# Patient Record
Sex: FEMALE | Race: WHITE | URBAN - NONMETROPOLITAN AREA
[De-identification: names, ages, dates, MRNs, and addresses within clinical notes are randomized per-mention and may not be internally consistent; named-entity substitution may affect disease eponyms.]

---

## 2023-09-11 ENCOUNTER — OFFICE VISIT (OUTPATIENT)
Dept: PRIMARY CARE | Facility: CLINIC | Age: 33
End: 2023-09-11
Payer: COMMERCIAL

## 2023-09-11 VITALS
DIASTOLIC BLOOD PRESSURE: 80 MMHG | OXYGEN SATURATION: 97 % | RESPIRATION RATE: 16 BRPM | TEMPERATURE: 97.4 F | HEART RATE: 66 BPM | SYSTOLIC BLOOD PRESSURE: 127 MMHG | BODY MASS INDEX: 36.27 KG/M2 | WEIGHT: 244.9 LBS | HEIGHT: 69 IN

## 2023-09-11 DIAGNOSIS — R06.83 SNORING: ICD-10-CM

## 2023-09-11 DIAGNOSIS — G47.8 NON-RESTORATIVE SLEEP: ICD-10-CM

## 2023-09-11 DIAGNOSIS — Z76.89 ENCOUNTER TO ESTABLISH CARE: Primary | ICD-10-CM

## 2023-09-11 DIAGNOSIS — D58.0 SPHEROCYTOSIS (CMS-HCC): ICD-10-CM

## 2023-09-11 DIAGNOSIS — F32.A ANXIETY AND DEPRESSION: ICD-10-CM

## 2023-09-11 DIAGNOSIS — E66.9 CLASS 2 OBESITY WITH BODY MASS INDEX (BMI) OF 36.0 TO 36.9 IN ADULT, UNSPECIFIED OBESITY TYPE, UNSPECIFIED WHETHER SERIOUS COMORBIDITY PRESENT: ICD-10-CM

## 2023-09-11 DIAGNOSIS — Z00.00 HEALTHCARE MAINTENANCE: ICD-10-CM

## 2023-09-11 DIAGNOSIS — F41.9 ANXIETY AND DEPRESSION: ICD-10-CM

## 2023-09-11 DIAGNOSIS — R53.83 FATIGUE, UNSPECIFIED TYPE: ICD-10-CM

## 2023-09-11 PROBLEM — E66.812 CLASS 2 OBESITY WITH BODY MASS INDEX (BMI) OF 36.0 TO 36.9 IN ADULT: Status: ACTIVE | Noted: 2023-09-11

## 2023-09-11 PROCEDURE — 1036F TOBACCO NON-USER: CPT | Performed by: FAMILY MEDICINE

## 2023-09-11 PROCEDURE — 99203 OFFICE O/P NEW LOW 30 MIN: CPT | Performed by: FAMILY MEDICINE

## 2023-09-11 PROCEDURE — 3008F BODY MASS INDEX DOCD: CPT | Performed by: FAMILY MEDICINE

## 2023-09-11 RX ORDER — FLUOXETINE HYDROCHLORIDE 20 MG/1
20 CAPSULE ORAL DAILY
Qty: 30 CAPSULE | Refills: 1 | Status: SHIPPED | OUTPATIENT
Start: 2023-09-11 | End: 2023-10-24 | Stop reason: SDUPTHER

## 2023-09-11 ASSESSMENT — PATIENT HEALTH QUESTIONNAIRE - PHQ9
7. TROUBLE CONCENTRATING ON THINGS, SUCH AS READING THE NEWSPAPER OR WATCHING TELEVISION: NEARLY EVERY DAY
5. POOR APPETITE OR OVEREATING: NEARLY EVERY DAY
4. FEELING TIRED OR HAVING LITTLE ENERGY: NEARLY EVERY DAY
3. TROUBLE FALLING OR STAYING ASLEEP OR SLEEPING TOO MUCH: NEARLY EVERY DAY
9. THOUGHTS THAT YOU WOULD BE BETTER OFF DEAD, OR OF HURTING YOURSELF: NOT AT ALL
8. MOVING OR SPEAKING SO SLOWLY THAT OTHER PEOPLE COULD HAVE NOTICED. OR THE OPPOSITE, BEING SO FIGETY OR RESTLESS THAT YOU HAVE BEEN MOVING AROUND A LOT MORE THAN USUAL: MORE THAN HALF THE DAYS
SUM OF ALL RESPONSES TO PHQ QUESTIONS 1-9: 23
2. FEELING DOWN, DEPRESSED OR HOPELESS: NEARLY EVERY DAY
6. FEELING BAD ABOUT YOURSELF - OR THAT YOU ARE A FAILURE OR HAVE LET YOURSELF OR YOUR FAMILY DOWN: NEARLY EVERY DAY
SUM OF ALL RESPONSES TO PHQ9 QUESTIONS 1 AND 2: 6
1. LITTLE INTEREST OR PLEASURE IN DOING THINGS: NEARLY EVERY DAY

## 2023-09-11 ASSESSMENT — ANXIETY QUESTIONNAIRES
GAD7 TOTAL SCORE: 21
1. FEELING NERVOUS, ANXIOUS, OR ON EDGE: NEARLY EVERY DAY
5. BEING SO RESTLESS THAT IT IS HARD TO SIT STILL: NEARLY EVERY DAY
7. FEELING AFRAID AS IF SOMETHING AWFUL MIGHT HAPPEN: NEARLY EVERY DAY
2. NOT BEING ABLE TO STOP OR CONTROL WORRYING: NEARLY EVERY DAY
6. BECOMING EASILY ANNOYED OR IRRITABLE: NEARLY EVERY DAY
3. WORRYING TOO MUCH ABOUT DIFFERENT THINGS: NEARLY EVERY DAY
4. TROUBLE RELAXING: NEARLY EVERY DAY

## 2023-09-11 NOTE — ASSESSMENT & PLAN NOTE
+ daytime somnolence, + snoring, + non-restorative sleep, there is concern for possible sleep apnea.    Uncontrolled sleep apnea can lead to fatigue, daytime somnolence, weight gain, hypertension, and fluid retention.     At home sleep study ordered. After you drop off equipment it takes 3-4 weeks to get results from studies.     If home sleep study is positive for sleep apnea, I will call to let you know. Call your insurance and see where they cover CPAP equipment. I will fax order for equipment where ever appropriate. We will follow up 30 days after you start this CPAP.     If you have every sign of sleep apnea and at home sleep study is negative, I will make a referral to sleep medicine for formal sleep study.

## 2023-09-11 NOTE — ASSESSMENT & PLAN NOTE
>>ASSESSMENT AND PLAN FOR SPHEROCYTOSIS (CMS/[facility]) WRITTEN ON 9/11/2023  3:34 PM BY ENOCH HAMLIN    Patient reports typically results in leukocytosis, will occasional have anemia or abnormal platelets.  Not presently on any therapy.  CBC ordered today, will revisit at follow-up.

## 2023-09-11 NOTE — PROGRESS NOTES
Subjective   Patient ID: Freida Mcbride is a 33 y.o. female who presents for New Patient Visit (Pt presents for new patient visit to [facility] care- pt states no concerns at this time ) and Flu Vaccine (Pt declines flu vaccine at this time. ).    HPI     ANTOINETTE Guan is a new patient here to establish care.  She is not presently on any prescription medications.    She states she is doing well overall, feels healthy for most part.  Notes she would like to loose weight, states has never been this heavy.  She stays busy with 3 kids and working full time.  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  She is  at high school.    Endorses fatigue, unsure if due to busy lifestyle.  Also notes irritability, states has very little patience.  She reports snoring, non-restorative sleep.  She has never had sleep study evaluation.    She is a triplet  Lost one sister in last year due to blood clot, occurred after she got covid vaccine and was sick.  Her other sister lives with her mom as she has down syndrome.    She was told she had murmur when she was little.  Has not had echo since that time.  Patient denies chest pain, shortness of breath with exertion, palpitations, lower extremity edema, headache.    Notes history of spherocytosis, states typically has leukocytosis sometimes platelets abnormal, sometimes anemia but not always.    She is scheduled to have pap done next month.    Patient declines influenza vaccine.    Grandmother with history of breast cancer diagnosed in 30s.    FMHx of anxiety and depression  Tried Zoloft when pregnant in the past but had weight gain and was ineffective in regards to mood.    PHQ-9 is 23 today  JUANITA-7 is 21 today    Review of Systems   All other systems reviewed and are negative.      Objective   /80 (BP Location: Left arm, Patient Position: Sitting, BP Cuff Size: Large adult)   Pulse 66   Temp 36.3 °C (97.4 °F) (Temporal)   Resp 16   " Ht 1.753 m (5' 9\")   Wt 111 kg (244 lb 14.4 oz)   LMP 09/08/2023 (Exact Date)   SpO2 97%   BMI 36.17 kg/m²     Physical Exam  Vitals and nursing note reviewed.   Constitutional:       General: She is not in acute distress.     Appearance: Normal appearance. She is not toxic-appearing.   HENT:      Head: Normocephalic and atraumatic.      Mouth/Throat:      Comments: Low soft palate; macroglossia  Neck:      Thyroid: No thyromegaly.   Cardiovascular:      Rate and Rhythm: Normal rate and regular rhythm.      Heart sounds: No murmur heard.     No friction rub. No gallop.   Pulmonary:      Effort: Pulmonary effort is normal.      Breath sounds: Normal breath sounds. No wheezing, rhonchi or rales.   Abdominal:      General: Bowel sounds are normal. There is no distension.      Palpations: Abdomen is soft. There is no mass.      Tenderness: There is no abdominal tenderness. There is no guarding.   Musculoskeletal:      Right lower leg: No edema.      Left lower leg: No edema.   Lymphadenopathy:      Cervical: No cervical adenopathy.   Neurological:      General: No focal deficit present.      Mental Status: She is alert and oriented to person, place, and time.   Psychiatric:         Mood and Affect: Mood normal.         Behavior: Behavior normal.         Assessment/Plan   Problem List Items Addressed This Visit       Snoring     + daytime somnolence, + snoring, + non-restorative sleep, there is concern for possible sleep apnea.    Uncontrolled sleep apnea can lead to fatigue, daytime somnolence, weight gain, hypertension, and fluid retention.     At home sleep study ordered. After you drop off equipment it takes 3-4 weeks to get results from studies.     If home sleep study is positive for sleep apnea, I will call to let you know. Call your insurance and see where they cover CPAP equipment. I will fax order for equipment where ever appropriate. We will follow up 30 days after you start this CPAP.     If you have " every sign of sleep apnea and at home sleep study is negative, I will make a referral to sleep medicine for formal sleep study.          Relevant Orders    Home sleep apnea test (HSAT)    Fatigue     Routine blood work ordered today, including TSH to r/o thyroid disorder.         Relevant Orders    TSH with reflex to Free T4 if abnormal    Spherocytosis (CMS/HCC)     Patient reports typically results in leukocytosis, will occasional have anemia or abnormal platelets.  Not presently on any therapy.  CBC ordered today, will revisit at follow-up.         Class 2 obesity with body mass index (BMI) of 36.0 to 36.9 in adult     INTERMITTENT FASTING  You may want to consider intermittent fasting.    There are several different approaches to this, but a straightforward and fairly easy one is to refrain from eating any calories for 12 hours every day. For example, from 8 PM until 8 AM, have nothing but water, or black coffee or tea in the morning. No cream or sugar should be used if you have coffee or tea. Make sure you have adequate fluid intake during this time, and throughout the day. This. A fast demands that your body pull upon stores of visceral fat for energy. This can help improve sugar readings, can improve cholesterol profiles, can decrease waist circumference, and can contribute to weight loss.    If after one month of 12 hour intermittent fasting, you feel comfortable increasing the hours, you may go for 14 hours, and even up to 16 hours. For example, stopped eating at 8 PM and don't eat again until noon the next day, breaking the fast at lunchtime. Snacking during the period of fast will break the fast and will not result in the same outcomes.    It is not recommended that you go beyond 16 hours of fasting.     LIFESTYLE MEASURES  -protein, whatever source you prefer, at intake of 1 g per pound of body weight is recommended daily.  -make sure you are avoiding refined carbs such as breads, pasta, cereal, candy,  soda,  nutrition bars, granola, chips, and sugar sweetened beverages.      -eat 5- 7 servings daily of veggies,  healthy protein such as chicken, fish,  beans, and eggs, and include healthy fats in your diet such as seeds, nuts, olive oil, avocados, and salmon.   -exercise 4 - 6 days per week as you are able, 150 minutes total weekly divided up is recommended.  -strength training is recommended 4:1 to cardio  -Vitamin D is recommended at 1000 - 5000 IU international units daily.   -Always wear sunscreen when you have sun exposure.  -64 oz of water is recommended daily.  -Dental visits recommended every 6 months.  -Eye exam recommended every 2 years, for those with vision problems every year.           Anxiety and depression     Scales and scores have been reviewed and discussed, these are notably elevated.  She has tried Zoloft in past but was ineffective as well as caused weight gain.  Will start patient on trial of Prozac 20 mg daily.  Patient advised it can take 4-6 weeks for medication to reach steady state thus we may not know if this works for patient until this time. Side effects reviewed and discussed, most of these resolve within the first week.  Will recheck mood in 4-6 weeks.         Relevant Medications    FLUoxetine (PROzac) 20 mg capsule     Other Visit Diagnoses       Encounter to establish care    -  Primary    Non-restorative sleep        Relevant Orders    Home sleep apnea test (HSAT)    Healthcare maintenance        Relevant Orders    CBC    Comprehensive Metabolic Panel    Lipid Panel            NPV today for acute issues only, HM labs ordered today, will have patient return for physical portion of CPE.    Follow-up in 4-6 weeks for recheck mood, review labs, and annual physical.   Scales upon rooming.   Labs to be done prior.  Call for sooner follow-up if needed.     Scribe Attestation  By signing my name below, ITanisha, Cristi   attest that this documentation has been prepared under  the direction and in the presence of Callie Cage DO.

## 2023-09-11 NOTE — ASSESSMENT & PLAN NOTE
INTERMITTENT FASTING  You may want to consider intermittent fasting.    There are several different approaches to this, but a straightforward and fairly easy one is to refrain from eating any calories for 12 hours every day. For example, from 8 PM until 8 AM, have nothing but water, or black coffee or tea in the morning. No cream or sugar should be used if you have coffee or tea. Make sure you have adequate fluid intake during this time, and throughout the day. This. A fast demands that your body pull upon stores of visceral fat for energy. This can help improve sugar readings, can improve cholesterol profiles, can decrease waist circumference, and can contribute to weight loss.    If after one month of 12 hour intermittent fasting, you feel comfortable increasing the hours, you may go for 14 hours, and even up to 16 hours. For example, stopped eating at 8 PM and don't eat again until noon the next day, breaking the fast at lunchtime. Snacking during the period of fast will break the fast and will not result in the same outcomes.    It is not recommended that you go beyond 16 hours of fasting.     LIFESTYLE MEASURES  -protein, whatever source you prefer, at intake of 1 g per pound of body weight is recommended daily.  -make sure you are avoiding refined carbs such as breads, pasta, cereal, candy, soda,  nutrition bars, granola, chips, and sugar sweetened beverages.      -eat 5- 7 servings daily of veggies,  healthy protein such as chicken, fish,  beans, and eggs, and include healthy fats in your diet such as seeds, nuts, olive oil, avocados, and salmon.   -exercise 4 - 6 days per week as you are able, 150 minutes total weekly divided up is recommended.  -strength training is recommended 4:1 to cardio  -Vitamin D is recommended at 1000 - 5000 IU international units daily.   -Always wear sunscreen when you have sun exposure.  -64 oz of water is recommended daily.  -Dental visits recommended every 6 months.  -Eye exam  recommended every 2 years, for those with vision problems every year.

## 2023-09-11 NOTE — ASSESSMENT & PLAN NOTE
Patient reports typically results in leukocytosis, will occasional have anemia or abnormal platelets.  Not presently on any therapy.  CBC ordered today, will revisit at follow-up.

## 2023-09-11 NOTE — ASSESSMENT & PLAN NOTE
Scales and scores have been reviewed and discussed, these are notably elevated.  She has tried Zoloft in past but was ineffective as well as caused weight gain.  Will start patient on trial of Prozac 20 mg daily.  Patient advised it can take 4-6 weeks for medication to reach steady state thus we may not know if this works for patient until this time. Side effects reviewed and discussed, most of these resolve within the first week.  Will recheck mood in 4-6 weeks.

## 2023-10-16 ENCOUNTER — LAB (OUTPATIENT)
Dept: LAB | Facility: LAB | Age: 33
End: 2023-10-16
Payer: COMMERCIAL

## 2023-10-16 DIAGNOSIS — Z00.00 HEALTHCARE MAINTENANCE: ICD-10-CM

## 2023-10-16 DIAGNOSIS — R53.83 FATIGUE, UNSPECIFIED TYPE: ICD-10-CM

## 2023-10-16 LAB
ALBUMIN SERPL BCP-MCNC: 4.1 G/DL (ref 3.4–5)
ALP SERPL-CCNC: 65 U/L (ref 33–110)
ALT SERPL W P-5'-P-CCNC: 14 U/L (ref 7–45)
ANION GAP SERPL CALC-SCNC: 15 MMOL/L (ref 10–20)
AST SERPL W P-5'-P-CCNC: 14 U/L (ref 9–39)
BILIRUB SERPL-MCNC: 0.8 MG/DL (ref 0–1.2)
BUN SERPL-MCNC: 7 MG/DL (ref 6–23)
CALCIUM SERPL-MCNC: 9.2 MG/DL (ref 8.6–10.6)
CHLORIDE SERPL-SCNC: 104 MMOL/L (ref 98–107)
CHOLEST SERPL-MCNC: 183 MG/DL (ref 0–199)
CHOLESTEROL/HDL RATIO: 6.4
CO2 SERPL-SCNC: 23 MMOL/L (ref 21–32)
CREAT SERPL-MCNC: 0.55 MG/DL (ref 0.5–1.05)
ERYTHROCYTE [DISTWIDTH] IN BLOOD BY AUTOMATED COUNT: 13.1 % (ref 11.5–14.5)
GFR SERPL CREATININE-BSD FRML MDRD: >90 ML/MIN/1.73M*2
GLUCOSE SERPL-MCNC: 97 MG/DL (ref 74–99)
HCT VFR BLD AUTO: 42 % (ref 36–46)
HDLC SERPL-MCNC: 28.8 MG/DL
HGB BLD-MCNC: 13.8 G/DL (ref 12–16)
LDLC SERPL CALC-MCNC: 117 MG/DL
MCH RBC QN AUTO: 33.3 PG (ref 26–34)
MCHC RBC AUTO-ENTMCNC: 32.9 G/DL (ref 32–36)
MCV RBC AUTO: 101 FL (ref 80–100)
NON HDL CHOLESTEROL: 154 MG/DL (ref 0–149)
NRBC BLD-RTO: 0 /100 WBCS (ref 0–0)
PLATELET # BLD AUTO: 593 X10*3/UL (ref 150–450)
PMV BLD AUTO: 11.7 FL (ref 7.5–11.5)
POTASSIUM SERPL-SCNC: 4.6 MMOL/L (ref 3.5–5.3)
PROT SERPL-MCNC: 7.1 G/DL (ref 6.4–8.2)
RBC # BLD AUTO: 4.14 X10*6/UL (ref 4–5.2)
SODIUM SERPL-SCNC: 137 MMOL/L (ref 136–145)
TRIGL SERPL-MCNC: 185 MG/DL (ref 0–149)
TSH SERPL-ACNC: 1.65 MIU/L (ref 0.44–3.98)
VLDL: 37 MG/DL (ref 0–40)
WBC # BLD AUTO: 13.7 X10*3/UL (ref 4.4–11.3)

## 2023-10-16 PROCEDURE — 36415 COLL VENOUS BLD VENIPUNCTURE: CPT

## 2023-10-16 PROCEDURE — 85027 COMPLETE CBC AUTOMATED: CPT

## 2023-10-16 PROCEDURE — 80061 LIPID PANEL: CPT

## 2023-10-16 PROCEDURE — 80053 COMPREHEN METABOLIC PANEL: CPT

## 2023-10-16 PROCEDURE — 84443 ASSAY THYROID STIM HORMONE: CPT

## 2023-10-23 ENCOUNTER — APPOINTMENT (OUTPATIENT)
Dept: PRIMARY CARE | Facility: CLINIC | Age: 33
End: 2023-10-23
Payer: COMMERCIAL

## 2023-10-24 ENCOUNTER — OFFICE VISIT (OUTPATIENT)
Dept: PRIMARY CARE | Facility: CLINIC | Age: 33
End: 2023-10-24
Payer: COMMERCIAL

## 2023-10-24 VITALS
RESPIRATION RATE: 14 BRPM | TEMPERATURE: 97.8 F | OXYGEN SATURATION: 95 % | HEART RATE: 73 BPM | BODY MASS INDEX: 35.7 KG/M2 | HEIGHT: 69 IN | SYSTOLIC BLOOD PRESSURE: 135 MMHG | WEIGHT: 241 LBS | DIASTOLIC BLOOD PRESSURE: 81 MMHG

## 2023-10-24 DIAGNOSIS — D58.0 HEREDITARY SPHEROCYTOSIS (CMS-HCC): ICD-10-CM

## 2023-10-24 DIAGNOSIS — F32.A ANXIETY AND DEPRESSION: ICD-10-CM

## 2023-10-24 DIAGNOSIS — R06.83 SNORING: ICD-10-CM

## 2023-10-24 DIAGNOSIS — E66.9 CLASS 2 OBESITY WITH BODY MASS INDEX (BMI) OF 35.0 TO 35.9 IN ADULT, UNSPECIFIED OBESITY TYPE, UNSPECIFIED WHETHER SERIOUS COMORBIDITY PRESENT: ICD-10-CM

## 2023-10-24 DIAGNOSIS — F41.9 ANXIETY AND DEPRESSION: ICD-10-CM

## 2023-10-24 DIAGNOSIS — Z00.00 HEALTHCARE MAINTENANCE: Primary | ICD-10-CM

## 2023-10-24 PROBLEM — Z90.81 ACQUIRED ASPLENIA: Status: ACTIVE | Noted: 2020-01-24

## 2023-10-24 PROBLEM — E66.812 CLASS 2 OBESITY WITH BODY MASS INDEX (BMI) OF 36.0 TO 36.9 IN ADULT: Status: RESOLVED | Noted: 2023-09-11 | Resolved: 2023-10-24

## 2023-10-24 PROBLEM — E78.5 HLD (HYPERLIPIDEMIA): Status: ACTIVE | Noted: 2023-10-24

## 2023-10-24 PROBLEM — R91.8 PULMONARY NODULES: Status: ACTIVE | Noted: 2020-01-17

## 2023-10-24 PROCEDURE — 99395 PREV VISIT EST AGE 18-39: CPT | Performed by: FAMILY MEDICINE

## 2023-10-24 PROCEDURE — 1036F TOBACCO NON-USER: CPT | Performed by: FAMILY MEDICINE

## 2023-10-24 PROCEDURE — 3008F BODY MASS INDEX DOCD: CPT | Performed by: FAMILY MEDICINE

## 2023-10-24 RX ORDER — FLUOXETINE HYDROCHLORIDE 20 MG/1
20 CAPSULE ORAL DAILY
Qty: 180 CAPSULE | Refills: 1 | Status: SHIPPED | OUTPATIENT
Start: 2023-10-24 | End: 2024-02-29 | Stop reason: SDUPTHER

## 2023-10-24 ASSESSMENT — ANXIETY QUESTIONNAIRES
1. FEELING NERVOUS, ANXIOUS, OR ON EDGE: SEVERAL DAYS
7. FEELING AFRAID AS IF SOMETHING AWFUL MIGHT HAPPEN: NOT AT ALL
3. WORRYING TOO MUCH ABOUT DIFFERENT THINGS: NOT AT ALL
5. BEING SO RESTLESS THAT IT IS HARD TO SIT STILL: SEVERAL DAYS
GAD7 TOTAL SCORE: 4
2. NOT BEING ABLE TO STOP OR CONTROL WORRYING: SEVERAL DAYS
4. TROUBLE RELAXING: SEVERAL DAYS
6. BECOMING EASILY ANNOYED OR IRRITABLE: NOT AT ALL

## 2023-10-24 ASSESSMENT — PATIENT HEALTH QUESTIONNAIRE - PHQ9
5. POOR APPETITE OR OVEREATING: SEVERAL DAYS
2. FEELING DOWN, DEPRESSED OR HOPELESS: NOT AT ALL
7. TROUBLE CONCENTRATING ON THINGS, SUCH AS READING THE NEWSPAPER OR WATCHING TELEVISION: NOT AT ALL
1. LITTLE INTEREST OR PLEASURE IN DOING THINGS: MORE THAN HALF THE DAYS
6. FEELING BAD ABOUT YOURSELF - OR THAT YOU ARE A FAILURE OR HAVE LET YOURSELF OR YOUR FAMILY DOWN: SEVERAL DAYS
3. TROUBLE FALLING OR STAYING ASLEEP OR SLEEPING TOO MUCH: NOT AT ALL
9. THOUGHTS THAT YOU WOULD BE BETTER OFF DEAD, OR OF HURTING YOURSELF: NOT AT ALL
SUM OF ALL RESPONSES TO PHQ9 QUESTIONS 1 AND 2: 2
8. MOVING OR SPEAKING SO SLOWLY THAT OTHER PEOPLE COULD HAVE NOTICED. OR THE OPPOSITE, BEING SO FIGETY OR RESTLESS THAT YOU HAVE BEEN MOVING AROUND A LOT MORE THAN USUAL: NOT AT ALL
4. FEELING TIRED OR HAVING LITTLE ENERGY: MORE THAN HALF THE DAYS
SUM OF ALL RESPONSES TO PHQ QUESTIONS 1-9: 6

## 2023-10-24 NOTE — ASSESSMENT & PLAN NOTE
Lifestyle managed  Reports grandmother with hx of MI.  No MI or stroke in mother or father.    10/2023 TC/HDL ratio 6.4, HDL 28, , TRIG 185  Goal TC/HDL ratio 3.4 or less, LDL 99 or less,  or less, and TRIG 150 or less.     Diet and exercise recommendations revisited.     LDL, the bad cholesterol, is elevated. To lower this with lifestyle measures:  -make sure you are avoiding refined carbs such as breads, pasta, cereal, candy, soda,  nutrition bars, granola, chips, and sugar sweetened beverages.      -eat 5- 7 servings daily of veggies,  healthy protein such as chicken, fish,  beans, and eggs, and include healthy fats in your diet such as seeds, nuts, olive oil, avocados, and salmon.   -exercise 4 - 6 days per week as you are able, 150 minutes total weekly divided up is recommended.  -consider taking the fiber product psyllium capsules 2 times daily (generic brand is fine) , or a brand name psyllium such as [employer] or Metamucil. WORK UP TO 2 TIMES DAILY.  -consider also adding plant stanols and sterols such as Nature Made CholestOff, available over the counter.

## 2023-10-24 NOTE — PROGRESS NOTES
Subjective   Patient ID: Freida Mcbride is a 33 y.o. female who presents for Annual Exam, Depression, and Anxiety.    HPI     Patient presents today for routine follow-up + CPE.  Patient is doing well overall, they have no new concerns or issues.     WELLNESS VISIT   TDAP: 7/2022  PAP: none found - scheduled 2/2024 per patient  MAMMO: N/A (grandmother w/ h/o of breast cancer)  CSCOPE: N/A  HEP C SCREEN: 7/2022 negative  LIPID: 10/2023 TC/HDL ratio 6.4, HDL 28, , TRIG 185    Alcohol use: rare, 1-2 times per year  Smoking: never smoker    Dental visits: UTD  Vision screening: UTD    Patient declines influenza vaccine.    Cervical cancer screening: Scheduled with GYN in 2/2024  Hx of abnormal paps before pregnancy, have been normal since.  Denies family history in first degree relative.  Denies pelvic pain, vaginal discharge, or vaginal bleeding.    Breast cancer screening: N/A  Denies family history in first degree relative.  Reports family history of second degree relative, grandmother.  Denies lumps/bumps, skin changes, nipple retraction, or nipple drainage.     Colon cancer screening: N/A  Denies family history in first degree relative.  Denies melena, hematochezia, constipation, diarrhea, bloating, change in bowel habits.  Does not have BM every day    ROUTINE VISIT  CHRONIC CONDITIONS:   -Possible sleep apnea  + daytime somnolence, + snoring, + non-restorative sleep  Home sleep study ordered 9/2023  Neck circumference 17 inches 43 cm    -Mood  Hx of anxiety and depression.    Current regimen:  Prozac 20 mg daily, started 9/2023    Prior medications:   Zoloft, ineffective ad caused weight gain.    Patient states she feels better.  She is not on edge as often, thoughts not running as much.  Can sit and relax with kids.  No side effects reported.    Patient is taking and tolerating the medications as prescribed.   Patient denies suicidal ideation or homicidal ideation.   Patient denies any symptoms of lidya  "such as pressured speech, impulsive purchases.  Patient reports good control on the current medication.  Patient is satisfied with their current regimen and wishes to continue.     -Hereditary Spherocytosis  S/p splenectomy age 3  Not presently on any therapy.  Mostly presents with leukocytosis, will occasional have anemia or abnormal platelets.    10/2023 CBC with WBC 13.7 and PLTs 593 k, no anemia.    -HLD  Lifestyle managed  Reports grandmother with hx of MI.  No MI or stroke in mother or father.  10/2023 TC/HDL ratio 6.4, HDL 28, , TRIG 185    Patient denies any facial droop, sudden vision loss, weakness or numbness on one side of body.   Patient denies chest pain, shortness of breath with exertion, palpitations, or symptoms of claudication.       Review of Systems   All other systems reviewed and are negative.      Objective   /81 (BP Location: Left arm, Patient Position: Sitting, BP Cuff Size: Large adult)   Pulse 73   Temp 36.6 °C (97.8 °F) (Temporal)   Resp 14   Ht 1.753 m (5' 9\")   Wt 109 kg (241 lb)   SpO2 95%   BMI 35.59 kg/m²     Physical Exam  Vitals and nursing note reviewed.   Constitutional:       General: She is not in acute distress.     Appearance: Normal appearance. She is not toxic-appearing.   HENT:      Head: Normocephalic and atraumatic.      Mouth/Throat:      Mouth: Mucous membranes are moist.      Pharynx: Oropharynx is clear.      Comments: Low soft palate; macroglossia  Neck:      Thyroid: No thyromegaly.   Cardiovascular:      Rate and Rhythm: Normal rate and regular rhythm.      Heart sounds: No murmur heard.     No friction rub. No gallop.   Pulmonary:      Effort: Pulmonary effort is normal.      Breath sounds: Normal breath sounds. No wheezing, rhonchi or rales.   Abdominal:      General: Bowel sounds are normal. There is no distension.      Palpations: Abdomen is soft. There is no mass.      Tenderness: There is no abdominal tenderness. There is no guarding. "   Musculoskeletal:      Right lower leg: No edema.      Left lower leg: No edema.   Lymphadenopathy:      Cervical: No cervical adenopathy.   Neurological:      General: No focal deficit present.      Mental Status: She is alert and oriented to person, place, and time.   Psychiatric:         Mood and Affect: Mood normal.         Behavior: Behavior normal.         Assessment/Plan   Problem List Items Addressed This Visit             ICD-10-CM    Snoring R06.83     + daytime somnolence, + snoring, + non-restorative sleep, there is concern for possible sleep apnea.    Uncontrolled sleep apnea can lead to fatigue, daytime somnolence, weight gain, hypertension, and fluid retention.     At home sleep study ordered 9/2023, patient did not get call, will reinitiate this process.  Neck circumference 17 inches 43 cm  After you drop off equipment it takes 3-4 weeks to get results from studies.     If home sleep study is positive for sleep apnea, I will call to let you know. Call your insurance and see where they cover CPAP equipment. I will fax order for equipment where ever appropriate. We will follow up 30 days after you start this CPAP.     If you have every sign of sleep apnea and at home sleep study is negative, I will make a referral to sleep medicine for formal sleep study.          Anxiety and depression F41.9, F32.A     Scales and scores have been reviewed and discussed, these are much improved.  Since tolerating well and mood improved, continue Prozac 20 mg daily.  If you ever feel there is room for improvement you can increase the Prozac to 40 mg daily at any time, call for refills at increased dose if needed.         Hereditary spherocytosis (CMS/[facility]) D58.0     10/2023 CBC with WBC 13.7 and PLTs 593 k, no anemia.    S/p splenectomy age 3  Not presently on any therapy.  10/2023 CBC stable in comparison to CBC in 7/2022  Will continue with routine monitoring of CBC.         Healthcare maintenance - Primary Z00.00      Vaccines and screenings reviewed.  Questionnaires completed.  Health and wellness topics reviewed.  Diet and exercise recommendations revisited.  Routine blood work reviewed today.     VACCINES  Flu vaccine deferred  TDAP is UTD until 2032.  Shingrix recommended for ages 50+.    SCREENINGS:  Screening pap smear is managed by GYN, schedules for 2/2024.  Screening mammograms recommended starting age 40.  Screening colonoscopies recommended starting age 45.  Screening DEXA recommended starting age 65.    LIFESTYLE MEASURES  -consider starting Metamucil capsules for both cholesterol and to help with stool formation, see HLD A/P.  -make sure you are avoiding refined carbs such as breads, pasta, cereal, candy, soda,  nutrition bars, granola, chips, and sugar sweetened beverages.      -eat 5- 7 servings daily of veggies,  healthy protein such as chicken, fish,  beans, and eggs, and include healthy fats in your diet such as seeds, nuts, olive oil, avocados, and salmon.   -exercise 4 - 6 days per week as you are able, 150 minutes total weekly divided up is recommended.  -Vitamin D is recommended at 1000 - 5000 IU international units daily.   -Always wear sunscreen when you have sun exposure.  -64 oz of water is recommended daily.  -Dental visits recommended every 6 months.  -Eye exam recommended every 2 years, for those with vision problems every year.            Other Visit Diagnoses         Codes    Class 2 obesity with body mass index (BMI) of 35.0 to 35.9 in adult, unspecified obesity type, unspecified whether serious comorbidity present     E66.9, Z68.35            Follow-up in 6 months for routine care/recheck mood and sleep.  Scales upon rooming.     Can schedule sooner visit for review sleep apnea/CPAP if indicated or required by insurance.    Call for sooner follow-up if needed.       Scribe Attestation  By signing my name below, ITanisha Scribe   attest that this documentation has been prepared under the  direction and in the presence of Callie Cage DO.

## 2023-10-24 NOTE — ASSESSMENT & PLAN NOTE
Scales and scores have been reviewed and discussed, these are much improved.  Since tolerating well and mood improved, continue Prozac 20 mg daily.  If you ever feel there is room for improvement you can increase the Prozac to 40 mg daily at any time, call for refills at increased dose if needed.

## 2023-10-24 NOTE — ASSESSMENT & PLAN NOTE
+ daytime somnolence, + snoring, + non-restorative sleep, there is concern for possible sleep apnea.    Uncontrolled sleep apnea can lead to fatigue, daytime somnolence, weight gain, hypertension, and fluid retention.     At home sleep study ordered 9/2023, patient did not get call, will reinitiate this process.  Neck circumference 17 inches 43 cm  After you drop off equipment it takes 3-4 weeks to get results from studies.     If home sleep study is positive for sleep apnea, I will call to let you know. Call your insurance and see where they cover CPAP equipment. I will fax order for equipment where ever appropriate. We will follow up 30 days after you start this CPAP.     If you have every sign of sleep apnea and at home sleep study is negative, I will make a referral to sleep medicine for formal sleep study.

## 2023-10-24 NOTE — ASSESSMENT & PLAN NOTE
Vaccines and screenings reviewed.  Questionnaires completed.  Health and wellness topics reviewed.  Diet and exercise recommendations revisited.  Routine blood work reviewed today.     VACCINES  Flu vaccine deferred  TDAP is UTD until 2032.  Shingrix recommended for ages 50+.    SCREENINGS:  Screening pap smear is managed by GYN, schedules for 2/2024.  Screening mammograms recommended starting age 40.  Screening colonoscopies recommended starting age 45.  Screening DEXA recommended starting age 65.    LIFESTYLE MEASURES  -consider starting Metamucil capsules for both cholesterol and to help with stool formation, see HLD A/P.  -make sure you are avoiding refined carbs such as breads, pasta, cereal, candy, soda,  nutrition bars, granola, chips, and sugar sweetened beverages.      -eat 5- 7 servings daily of veggies,  healthy protein such as chicken, fish,  beans, and eggs, and include healthy fats in your diet such as seeds, nuts, olive oil, avocados, and salmon.   -exercise 4 - 6 days per week as you are able, 150 minutes total weekly divided up is recommended.  -Vitamin D is recommended at 1000 - 5000 IU international units daily.   -Always wear sunscreen when you have sun exposure.  -64 oz of water is recommended daily.  -Dental visits recommended every 6 months.  -Eye exam recommended every 2 years, for those with vision problems every year.

## 2023-10-24 NOTE — ASSESSMENT & PLAN NOTE
10/2023 CBC with WBC 13.7 and PLTs 593 k, no anemia.    S/p splenectomy age 3  Not presently on any therapy.  10/2023 CBC stable in comparison to CBC in 7/2022  Will continue with routine monitoring of CBC.

## 2024-02-29 ENCOUNTER — OFFICE VISIT (OUTPATIENT)
Dept: PRIMARY CARE | Facility: CLINIC | Age: 34
End: 2024-02-29
Payer: COMMERCIAL

## 2024-02-29 VITALS
WEIGHT: 250.1 LBS | HEART RATE: 70 BPM | SYSTOLIC BLOOD PRESSURE: 126 MMHG | RESPIRATION RATE: 16 BRPM | TEMPERATURE: 97.8 F | BODY MASS INDEX: 36.93 KG/M2 | DIASTOLIC BLOOD PRESSURE: 83 MMHG | OXYGEN SATURATION: 96 %

## 2024-02-29 DIAGNOSIS — F32.A ANXIETY AND DEPRESSION: ICD-10-CM

## 2024-02-29 DIAGNOSIS — R42 DIZZINESS: Primary | ICD-10-CM

## 2024-02-29 DIAGNOSIS — F41.9 ANXIETY AND DEPRESSION: ICD-10-CM

## 2024-02-29 PROCEDURE — 99213 OFFICE O/P EST LOW 20 MIN: CPT | Performed by: FAMILY MEDICINE

## 2024-02-29 PROCEDURE — 3008F BODY MASS INDEX DOCD: CPT | Performed by: FAMILY MEDICINE

## 2024-02-29 PROCEDURE — 1036F TOBACCO NON-USER: CPT | Performed by: FAMILY MEDICINE

## 2024-02-29 RX ORDER — MECLIZINE HYDROCHLORIDE 25 MG/1
25 TABLET ORAL 3 TIMES DAILY PRN
Qty: 30 TABLET | Refills: 11 | Status: SHIPPED | OUTPATIENT
Start: 2024-02-29 | End: 2025-02-28

## 2024-02-29 RX ORDER — FLUOXETINE HYDROCHLORIDE 20 MG/1
40 CAPSULE ORAL DAILY
Qty: 180 CAPSULE | Refills: 3
Start: 2024-02-29 | End: 2024-03-25 | Stop reason: SDUPTHER

## 2024-02-29 NOTE — ASSESSMENT & PLAN NOTE
Reports doing well on Prozac 20 mg that was started in Sept 2023 but notes increase in anxiety with circumstantial stressors (family hospitalized). Since tolerating current mood medication well will increase dose.    Plan:  -Increase Prozac from 20 mg to 40 mg daily  -If after 4 weeks on 40 mg there is still room for improvement can increase to 60 mg daily  -Advised that max daily dose in 60 mg daily.  -Will check scales at next routine follow-up.

## 2024-02-29 NOTE — PATIENT INSTRUCTIONS
Dizziness  I suspect you may have BPPV, which is Benign Paroxysmal Positional Vertigo.     Plan:  -Start OTC Flonase or Nasacort, 2 squirts each nostril daily x 1-2 weeks incase any inflammation in sinuses is contributing.  -Prescription for Meclizine (Antivert) sent to pharmacy. This is not always effective but patient can call for refills if works for her.  -Recommend searching on YouTube for The Epley Maneuver which will help with repositioning displaced Canaliths.  -If symptoms persist or recur, and at home exercises do not improve symptoms please call me and I will refer you to formal Vestibular Therapy     Anxiety and depression  Reports doing well on Prozac 20 mg that was started in Sept 2023 but notes increase in anxiety with circumstantial stressors (family hospitalized). Since tolerating current mood medication well will increase dose.    Plan:  -Increase Prozac from 20 mg to 40 mg daily  -If after 4 weeks on 40 mg there is still room for improvement can increase to 60 mg daily  -Advised that max daily dose in 60 mg daily.  -Will check scales at next routine follow-up.

## 2024-02-29 NOTE — ASSESSMENT & PLAN NOTE
I suspect you may have BPPV, which is Benign Paroxysmal Positional Vertigo.     Plan:  -Start OTC Flonase or Nasacort, 2 squirts each nostril daily x 1-2 weeks incase any inflammation in sinuses is contributing.  -Prescription for Meclizine (Antivert) sent to pharmacy. This is not always effective but patient can call for refills if works for her.  -Recommend searching on YouTube for The Epley Maneuver which will help with repositioning displaced Canaliths.  -If symptoms persist or recur, and at home exercises do not improve symptoms please call me and I will refer you to formal Vestibular Therapy

## 2024-03-25 DIAGNOSIS — F41.9 ANXIETY AND DEPRESSION: ICD-10-CM

## 2024-03-25 DIAGNOSIS — F32.A ANXIETY AND DEPRESSION: ICD-10-CM

## 2024-03-25 RX ORDER — FLUOXETINE HYDROCHLORIDE 20 MG/1
40 CAPSULE ORAL DAILY
Qty: 180 CAPSULE | Refills: 3 | Status: SHIPPED | OUTPATIENT
Start: 2024-03-25 | End: 2024-04-24 | Stop reason: SDUPTHER

## 2024-04-24 ENCOUNTER — OFFICE VISIT (OUTPATIENT)
Dept: PRIMARY CARE | Facility: CLINIC | Age: 34
End: 2024-04-24
Payer: COMMERCIAL

## 2024-04-24 VITALS
OXYGEN SATURATION: 96 % | SYSTOLIC BLOOD PRESSURE: 124 MMHG | TEMPERATURE: 98.1 F | DIASTOLIC BLOOD PRESSURE: 75 MMHG | RESPIRATION RATE: 14 BRPM | BODY MASS INDEX: 36.67 KG/M2 | WEIGHT: 248.3 LBS | HEART RATE: 73 BPM

## 2024-04-24 DIAGNOSIS — R06.83 SNORING: ICD-10-CM

## 2024-04-24 DIAGNOSIS — F32.A ANXIETY AND DEPRESSION: Primary | ICD-10-CM

## 2024-04-24 DIAGNOSIS — Z82.49 FAMILY HISTORY OF VALVULAR HEART DISEASE: ICD-10-CM

## 2024-04-24 DIAGNOSIS — Z00.00 HEALTHCARE MAINTENANCE: ICD-10-CM

## 2024-04-24 DIAGNOSIS — F41.9 ANXIETY AND DEPRESSION: Primary | ICD-10-CM

## 2024-04-24 DIAGNOSIS — R40.0 DAYTIME SOMNOLENCE: ICD-10-CM

## 2024-04-24 DIAGNOSIS — R00.2 PALPITATIONS: ICD-10-CM

## 2024-04-24 DIAGNOSIS — R01.1 MURMUR, CARDIAC: ICD-10-CM

## 2024-04-24 DIAGNOSIS — D58.0 HEREDITARY SPHEROCYTOSIS (CMS-HCC): ICD-10-CM

## 2024-04-24 DIAGNOSIS — G47.8 NON-RESTORATIVE SLEEP: ICD-10-CM

## 2024-04-24 PROCEDURE — 3008F BODY MASS INDEX DOCD: CPT | Performed by: FAMILY MEDICINE

## 2024-04-24 PROCEDURE — 99214 OFFICE O/P EST MOD 30 MIN: CPT | Performed by: FAMILY MEDICINE

## 2024-04-24 PROCEDURE — 1036F TOBACCO NON-USER: CPT | Performed by: FAMILY MEDICINE

## 2024-04-24 RX ORDER — FLUOXETINE HYDROCHLORIDE 20 MG/1
60 CAPSULE ORAL DAILY
Qty: 270 CAPSULE | Refills: 3 | Status: SHIPPED | OUTPATIENT
Start: 2024-04-24 | End: 2025-04-19

## 2024-04-24 ASSESSMENT — PATIENT HEALTH QUESTIONNAIRE - PHQ9
6. FEELING BAD ABOUT YOURSELF - OR THAT YOU ARE A FAILURE OR HAVE LET YOURSELF OR YOUR FAMILY DOWN: NOT AT ALL
2. FEELING DOWN, DEPRESSED OR HOPELESS: SEVERAL DAYS
5. POOR APPETITE OR OVEREATING: NOT AT ALL
1. LITTLE INTEREST OR PLEASURE IN DOING THINGS: NOT AT ALL
10. IF YOU CHECKED OFF ANY PROBLEMS, HOW DIFFICULT HAVE THESE PROBLEMS MADE IT FOR YOU TO DO YOUR WORK, TAKE CARE OF THINGS AT HOME, OR GET ALONG WITH OTHER PEOPLE: NOT DIFFICULT AT ALL
SUM OF ALL RESPONSES TO PHQ QUESTIONS 1-9: 6
4. FEELING TIRED OR HAVING LITTLE ENERGY: MORE THAN HALF THE DAYS
SUM OF ALL RESPONSES TO PHQ9 QUESTIONS 1 AND 2: 1
7. TROUBLE CONCENTRATING ON THINGS, SUCH AS READING THE NEWSPAPER OR WATCHING TELEVISION: NOT AT ALL
8. MOVING OR SPEAKING SO SLOWLY THAT OTHER PEOPLE COULD HAVE NOTICED. OR THE OPPOSITE, BEING SO FIGETY OR RESTLESS THAT YOU HAVE BEEN MOVING AROUND A LOT MORE THAN USUAL: NOT AT ALL
9. THOUGHTS THAT YOU WOULD BE BETTER OFF DEAD, OR OF HURTING YOURSELF: NOT AT ALL
3. TROUBLE FALLING OR STAYING ASLEEP OR SLEEPING TOO MUCH: NEARLY EVERY DAY

## 2024-04-24 ASSESSMENT — ANXIETY QUESTIONNAIRES
5. BEING SO RESTLESS THAT IT IS HARD TO SIT STILL: NEARLY EVERY DAY
4. TROUBLE RELAXING: NEARLY EVERY DAY
IF YOU CHECKED OFF ANY PROBLEMS ON THIS QUESTIONNAIRE, HOW DIFFICULT HAVE THESE PROBLEMS MADE IT FOR YOU TO DO YOUR WORK, TAKE CARE OF THINGS AT HOME, OR GET ALONG WITH OTHER PEOPLE: NOT DIFFICULT AT ALL
2. NOT BEING ABLE TO STOP OR CONTROL WORRYING: MORE THAN HALF THE DAYS
6. BECOMING EASILY ANNOYED OR IRRITABLE: MORE THAN HALF THE DAYS
1. FEELING NERVOUS, ANXIOUS, OR ON EDGE: MORE THAN HALF THE DAYS
7. FEELING AFRAID AS IF SOMETHING AWFUL MIGHT HAPPEN: SEVERAL DAYS
3. WORRYING TOO MUCH ABOUT DIFFERENT THINGS: NEARLY EVERY DAY
GAD7 TOTAL SCORE: 16

## 2024-04-24 NOTE — PROGRESS NOTES
Subjective   Patient ID: Freida Mcbride is a 33 y.o. female who presents for Follow-up (Pt presents for 6 month follow up scales, sleep- pt states that she would like to discuss upping the dose on her fluoxetine.) and Flu Vaccine (Pt declines flu vaccine at this time. /).    HPI     Patient presents today for routine 6 month follow-up.  Patient is doing well overall, they have no new concerns or issues.     She unexpectedly lost her grandpa a few weeks ago. She found him down in his home a few days after finding out that he bone cancer. Her kids 9 and 10 have been sleeping with her as a result of this. Apart from the kids being in her bed she is sleeping well otherwise.    ROUTINE VISIT  CHRONIC CONDITIONS:   Possible sleep apnea  + daytime somnolence, + snoring, + non-restorative sleep, there is concern for possible sleep apnea.     At home sleep study ordered 9/2023, patient did not get call, will reinitiate this process per 10/2023 OV note.    Neck circumference 17 inches 43 cm    Notes she is down some weight from watching sugars and carbs. She has also cut back on caffeine and increased her water intake.    Mood  Hx of anxiety and depression.  10/2023 TSH normal     Current regimen:  Prozac 40 mg daily, started 9/2023, increased 2/2024     Prior medications:   Zoloft, ineffective ad caused weight gain.    PHQ-9:   6  JUANITA-7: 16    +circumstantial stressors as noted above, took extra 20 mg today and wondering if she can go up daily. Tolerating the Prozac well.    Patient denies suicidal ideation or homicidal ideation.   Patient denies any symptoms of lidya such as pressured speech, impulsive purchases.    Hereditary Spherocytosis  S/p splenectomy age 3  Not presently on any therapy.  Mostly presents with leukocytosis, will occasional have anemia or abnormal platelets.      Review of Systems   All other systems reviewed and are negative.      Objective   /75 (BP Location: Right arm, Patient Position: Sitting,  BP Cuff Size: Large adult)   Pulse 73   Temp 36.7 °C (98.1 °F) (Temporal)   Resp 14   Wt 113 kg (248 lb 4.8 oz)   LMP 03/24/2024 (Approximate)   SpO2 96%   BMI 36.67 kg/m²     Physical Exam  Vitals and nursing note reviewed.   Constitutional:       General: She is not in acute distress.     Appearance: Normal appearance. She is not toxic-appearing.   HENT:      Head: Normocephalic and atraumatic.      Mouth/Throat:      Mouth: Mucous membranes are moist.      Pharynx: Oropharynx is clear.      Comments: Low soft palate; narrow maxilla, macroglossia  Neck:      Thyroid: No thyromegaly.      Comments: Neck circumference 17.5  Cardiovascular:      Rate and Rhythm: Normal rate and regular rhythm.      Heart sounds: Murmur heard.      Systolic murmur is present with a grade of 2/6.      No friction rub. No gallop.   Pulmonary:      Effort: Pulmonary effort is normal.      Breath sounds: Normal breath sounds. No wheezing, rhonchi or rales.   Abdominal:      General: Bowel sounds are normal. There is no distension.      Palpations: Abdomen is soft. There is no mass.      Tenderness: There is no abdominal tenderness. There is no guarding.   Musculoskeletal:      Right lower leg: Edema (trace) present.      Left lower leg: Edema (trace) present.   Lymphadenopathy:      Cervical: No cervical adenopathy.   Neurological:      General: No focal deficit present.      Mental Status: She is alert and oriented to person, place, and time.   Psychiatric:         Mood and Affect: Mood normal.         Behavior: Behavior normal.         Assessment/Plan   Problem List Items Addressed This Visit             ICD-10-CM    Snoring R06.83     + daytime somnolence, + snoring, + non-restorative sleep, there is concern for possible sleep apnea.    Uncontrolled sleep apnea can lead to fatigue, daytime somnolence, weight gain, hypertension, and fluid retention.     At home sleep study ordered fall 2023 however patient never received call to  set this up. Will place new order today to have this process reinitiated.    Neck circumference 17 inches 43 cm    After you drop off equipment it takes 3-4 weeks to get results from studies.     If home sleep study is positive for sleep apnea, I will call to let you know. Call your insurance and see where they cover CPAP equipment. I will fax order for equipment where ever appropriate. We will follow up 30 days after you start this CPAP.     If you have every sign of sleep apnea and at home sleep study is negative, I will make a referral to sleep medicine for formal sleep study.          Relevant Orders    Home sleep apnea test (HSAT)    Anxiety and depression - Primary F41.9, F32.A     Scales and scores reviewed and discussed, mildly elevated consistent with patient report of some circumstantial stressors. Since tolerating current mood medication well will increase dose.    Plan:  -Increase Prozac from 40 mg to 60 mg daily  -Will check scales at next routine follow-up.  -Patient to reach out to revisit mood/scales sooner if suboptimally controlled on max dose Prozac.         Hereditary spherocytosis (CMS-[facility]) D58.0     10/2023 CBC with WBC 13.7 and PLTs 593 k, no anemia.    S/p splenectomy age 3  Not presently on any therapy.  10/2023 CBC stable in comparison to CBC in 7/2022  Will continue with routine monitoring of CBC (ordered today to be done prior to CPE in fall 2024).         Healthcare maintenance Z00.00     CPE due in fall 2024, labs ordered today to be done prior to that visit.         Relevant Orders    CBC and Auto Differential    Comprehensive Metabolic Panel    Lipid Panel    Murmur, cardiac R01.1     2-3/6 systolic murmur RT upper and LT lower sternal borders noted on exam  Patient also experiencing palpitations but no chest pain or SOB.  Because symptomatic and + FMHx of valvular heart disease will get echocardiogram.  Await home sleep study as well.          Other Visit Diagnoses         Codes     Daytime somnolence     R40.0    Relevant Orders    Home sleep apnea test (HSAT)    Non-restorative sleep     G47.8    Relevant Orders    Home sleep apnea test (HSAT)    Palpitations     R00.2    Family history of valvular heart disease     Z82.49            Follow-up in 6 months for routine care + CPE.  Scales upon rooming.   Labs to be done prior.   Call for sooner follow-up if needed.         Scribe Attestation  By signing my name below, I, Cristi Mckeon   attest that this documentation has been prepared under the direction and in the presence of Callie Cage DO.

## 2024-04-24 NOTE — ASSESSMENT & PLAN NOTE
10/2023 CBC with WBC 13.7 and PLTs 593 k, no anemia.    S/p splenectomy age 3  Not presently on any therapy.  10/2023 CBC stable in comparison to CBC in 7/2022  Will continue with routine monitoring of CBC (ordered today to be done prior to CPE in fall 2024).

## 2024-04-24 NOTE — ASSESSMENT & PLAN NOTE
Scales and scores reviewed and discussed, mildly elevated consistent with patient report of some circumstantial stressors. Since tolerating current mood medication well will increase dose.    Plan:  -Increase Prozac from 40 mg to 60 mg daily  -Will check scales at next routine follow-up.  -Patient to reach out to revisit mood/scales sooner if suboptimally controlled on max dose Prozac.

## 2024-04-24 NOTE — ASSESSMENT & PLAN NOTE
+ daytime somnolence, + snoring, + non-restorative sleep, there is concern for possible sleep apnea.    Uncontrolled sleep apnea can lead to fatigue, daytime somnolence, weight gain, hypertension, and fluid retention.     At home sleep study ordered fall 2023 however patient never received call to set this up. Will place new order today to have this process reinitiated.    Neck circumference 17 inches 43 cm    After you drop off equipment it takes 3-4 weeks to get results from studies.     If home sleep study is positive for sleep apnea, I will call to let you know. Call your insurance and see where they cover CPAP equipment. I will fax order for equipment where ever appropriate. We will follow up 30 days after you start this CPAP.     If you have every sign of sleep apnea and at home sleep study is negative, I will make a referral to sleep medicine for formal sleep study.

## 2024-04-24 NOTE — PATIENT INSTRUCTIONS
Healthcare maintenance  CPE due in fall 2024, labs ordered today to be done prior to that visit.    Hereditary spherocytosis (CMS-HCC)  10/2023 CBC with WBC 13.7 and PLTs 593 k, no anemia.    S/p splenectomy age 3  Not presently on any therapy.  10/2023 CBC stable in comparison to CBC in 7/2022  Will continue with routine monitoring of CBC (ordered today to be done prior to CPE in fall 2024).    Anxiety and depression  Scales and scores reviewed and discussed, mildly elevated consistent with patient report of some circumstantial stressors. Since tolerating current mood medication well will increase dose.    Plan:  -Increase Prozac from 40 mg to 60 mg daily  -Will check scales at next routine follow-up.  -Patient to reach out to revisit mood/scales sooner if suboptimally controlled on max dose Prozac.    Snoring  + daytime somnolence, + snoring, + non-restorative sleep, there is concern for possible sleep apnea.    Uncontrolled sleep apnea can lead to fatigue, daytime somnolence, weight gain, hypertension, and fluid retention.     At home sleep study ordered fall 2023 however patient never received call to set this up. Will place new order today to have this process reinitiated.    Neck circumference 17 inches 43 cm    After you drop off equipment it takes 3-4 weeks to get results from studies.     If home sleep study is positive for sleep apnea, I will call to let you know. Call your insurance and see where they cover CPAP equipment. I will fax order for equipment where ever appropriate. We will follow up 30 days after you start this CPAP.     If you have every sign of sleep apnea and at home sleep study is negative, I will make a referral to sleep medicine for formal sleep study.     Murmur, cardiac  2-3/6 systolic murmur RT upper and LT lower sternal borders noted on exam  Patient also experiencing palpitations but no chest pain or SOB.  Because symptomatic and + FMHx of valvular heart disease will get  echocardiogram.  Await home sleep study as well.    Follow up in Oct,  labs before (fast 8 h min ,  have labs done 3-7 d before appt)

## 2024-04-24 NOTE — ASSESSMENT & PLAN NOTE
2-3/6 systolic murmur RT upper and LT lower sternal borders noted on exam  Patient also experiencing palpitations but no chest pain or SOB.  Because symptomatic and + FMHx of valvular heart disease will get echocardiogram.  Await home sleep study as well.

## 2024-05-09 ENCOUNTER — HOSPITAL ENCOUNTER (OUTPATIENT)
Dept: CARDIOLOGY | Facility: CLINIC | Age: 34
Discharge: HOME | End: 2024-05-09
Payer: COMMERCIAL

## 2024-05-09 DIAGNOSIS — Z82.49 FAMILY HISTORY OF VALVULAR HEART DISEASE: Primary | ICD-10-CM

## 2024-05-09 DIAGNOSIS — R06.83 SNORING: ICD-10-CM

## 2024-05-09 DIAGNOSIS — R00.2 PALPITATIONS: ICD-10-CM

## 2024-05-09 DIAGNOSIS — R01.1 MURMUR, CARDIAC: ICD-10-CM

## 2024-05-09 LAB
AORTIC VALVE MEAN GRADIENT: 10 MMHG
AORTIC VALVE PEAK VELOCITY: 2.2 M/S
AV PEAK GRADIENT: 19.4 MMHG
AVA (PEAK VEL): 2.1 CM2
AVA (VTI): 2.35 CM2
EJECTION FRACTION APICAL 4 CHAMBER: 67.6
LEFT ATRIUM VOLUME AREA LENGTH INDEX BSA: 24.7 ML/M2
LEFT VENTRICLE INTERNAL DIMENSION DIASTOLE: 4.7 CM (ref 3.5–6)
LEFT VENTRICULAR OUTFLOW TRACT DIAMETER: 2 CM
LV EJECTION FRACTION BIPLANE: 61 %
MITRAL VALVE E/A RATIO: 1.22
MITRAL VALVE E/E' RATIO: 9.84
RIGHT VENTRICLE FREE WALL PEAK S': 12.7 CM/S
TRICUSPID ANNULAR PLANE SYSTOLIC EXCURSION: 2.6 CM

## 2024-05-09 PROCEDURE — 93306 TTE W/DOPPLER COMPLETE: CPT

## 2024-05-09 PROCEDURE — 93306 TTE W/DOPPLER COMPLETE: CPT | Performed by: STUDENT IN AN ORGANIZED HEALTH CARE EDUCATION/TRAINING PROGRAM

## 2024-05-09 PROCEDURE — 2500000004 HC RX 250 GENERAL PHARMACY W/ HCPCS (ALT 636 FOR OP/ED): Performed by: STUDENT IN AN ORGANIZED HEALTH CARE EDUCATION/TRAINING PROGRAM

## 2024-05-09 RX ADMIN — PERFLUTREN 2 ML OF DILUTION: 6.52 INJECTION, SUSPENSION INTRAVENOUS at 15:22

## 2024-06-18 ENCOUNTER — TELEPHONE (OUTPATIENT)
Dept: PRIMARY CARE | Facility: CLINIC | Age: 34
End: 2024-06-18
Payer: COMMERCIAL

## 2024-06-18 NOTE — TELEPHONE ENCOUNTER
Please let the patient know her sleep study showed mild MANJINDER- we can do a VV to discuss in detail so we can order supplies (can schedule with any available provider)

## 2024-06-24 ENCOUNTER — APPOINTMENT (OUTPATIENT)
Dept: PRIMARY CARE | Facility: CLINIC | Age: 34
End: 2024-06-24
Payer: COMMERCIAL

## 2024-07-31 ENCOUNTER — TELEPHONE (OUTPATIENT)
Dept: PRIMARY CARE | Facility: CLINIC | Age: 34
End: 2024-07-31
Payer: COMMERCIAL

## 2024-10-22 ENCOUNTER — LAB (OUTPATIENT)
Dept: LAB | Facility: LAB | Age: 34
End: 2024-10-22
Payer: COMMERCIAL

## 2024-10-22 DIAGNOSIS — Z00.00 HEALTHCARE MAINTENANCE: ICD-10-CM

## 2024-10-22 PROCEDURE — 80061 LIPID PANEL: CPT

## 2024-10-22 PROCEDURE — 85025 COMPLETE CBC W/AUTO DIFF WBC: CPT

## 2024-10-22 PROCEDURE — 36415 COLL VENOUS BLD VENIPUNCTURE: CPT

## 2024-10-22 PROCEDURE — 80053 COMPREHEN METABOLIC PANEL: CPT

## 2024-10-23 LAB
ALBUMIN SERPL BCP-MCNC: 4.3 G/DL (ref 3.4–5)
ALP SERPL-CCNC: 65 U/L (ref 33–110)
ALT SERPL W P-5'-P-CCNC: 21 U/L (ref 7–45)
ANION GAP SERPL CALC-SCNC: 13 MMOL/L (ref 10–20)
AST SERPL W P-5'-P-CCNC: 18 U/L (ref 9–39)
BASOPHILS # BLD AUTO: 0.15 X10*3/UL (ref 0–0.1)
BASOPHILS NFR BLD AUTO: 0.9 %
BILIRUB SERPL-MCNC: 0.7 MG/DL (ref 0–1.2)
BUN SERPL-MCNC: 7 MG/DL (ref 6–23)
CALCIUM SERPL-MCNC: 9.5 MG/DL (ref 8.6–10.6)
CHLORIDE SERPL-SCNC: 101 MMOL/L (ref 98–107)
CHOLEST SERPL-MCNC: 182 MG/DL (ref 0–199)
CHOLESTEROL/HDL RATIO: 6.3
CO2 SERPL-SCNC: 28 MMOL/L (ref 21–32)
CREAT SERPL-MCNC: 0.58 MG/DL (ref 0.5–1.05)
EGFRCR SERPLBLD CKD-EPI 2021: >90 ML/MIN/1.73M*2
EOSINOPHIL # BLD AUTO: 0.99 X10*3/UL (ref 0–0.7)
EOSINOPHIL NFR BLD AUTO: 5.9 %
ERYTHROCYTE [DISTWIDTH] IN BLOOD BY AUTOMATED COUNT: 13.8 % (ref 11.5–14.5)
GLUCOSE SERPL-MCNC: 79 MG/DL (ref 74–99)
HCT VFR BLD AUTO: 39.5 % (ref 36–46)
HDLC SERPL-MCNC: 28.9 MG/DL
HGB BLD-MCNC: 13.2 G/DL (ref 12–16)
IMM GRANULOCYTES # BLD AUTO: 0.12 X10*3/UL (ref 0–0.7)
IMM GRANULOCYTES NFR BLD AUTO: 0.7 % (ref 0–0.9)
LDLC SERPL CALC-MCNC: 102 MG/DL
LYMPHOCYTES # BLD AUTO: 4.88 X10*3/UL (ref 1.2–4.8)
LYMPHOCYTES NFR BLD AUTO: 29.3 %
MCH RBC QN AUTO: 33.2 PG (ref 26–34)
MCHC RBC AUTO-ENTMCNC: 33.4 G/DL (ref 32–36)
MCV RBC AUTO: 100 FL (ref 80–100)
MONOCYTES # BLD AUTO: 1.37 X10*3/UL (ref 0.1–1)
MONOCYTES NFR BLD AUTO: 8.2 %
NEUTROPHILS # BLD AUTO: 9.15 X10*3/UL (ref 1.2–7.7)
NEUTROPHILS NFR BLD AUTO: 55 %
NON HDL CHOLESTEROL: 153 MG/DL (ref 0–149)
NRBC BLD-RTO: 0 /100 WBCS (ref 0–0)
PLATELET # BLD AUTO: 471 X10*3/UL (ref 150–450)
POTASSIUM SERPL-SCNC: 4.7 MMOL/L (ref 3.5–5.3)
PROT SERPL-MCNC: 7.4 G/DL (ref 6.4–8.2)
RBC # BLD AUTO: 3.97 X10*6/UL (ref 4–5.2)
SODIUM SERPL-SCNC: 137 MMOL/L (ref 136–145)
TRIGL SERPL-MCNC: 256 MG/DL (ref 0–149)
VLDL: 51 MG/DL (ref 0–40)
WBC # BLD AUTO: 16.7 X10*3/UL (ref 4.4–11.3)

## 2024-10-24 ENCOUNTER — APPOINTMENT (OUTPATIENT)
Dept: PRIMARY CARE | Facility: CLINIC | Age: 34
End: 2024-10-24
Payer: COMMERCIAL

## 2024-10-24 VITALS
WEIGHT: 244 LBS | SYSTOLIC BLOOD PRESSURE: 118 MMHG | DIASTOLIC BLOOD PRESSURE: 75 MMHG | BODY MASS INDEX: 34.93 KG/M2 | HEIGHT: 70 IN | TEMPERATURE: 98.6 F | HEART RATE: 75 BPM | OXYGEN SATURATION: 95 %

## 2024-10-24 DIAGNOSIS — G47.30 MILD SLEEP APNEA: ICD-10-CM

## 2024-10-24 DIAGNOSIS — H81.10 BENIGN PAROXYSMAL POSITIONAL VERTIGO, UNSPECIFIED LATERALITY: ICD-10-CM

## 2024-10-24 DIAGNOSIS — E66.812 CLASS 2 OBESITY WITH BODY MASS INDEX (BMI) OF 35.0 TO 35.9 IN ADULT, UNSPECIFIED OBESITY TYPE, UNSPECIFIED WHETHER SERIOUS COMORBIDITY PRESENT: ICD-10-CM

## 2024-10-24 DIAGNOSIS — F41.9 ANXIETY AND DEPRESSION: Primary | ICD-10-CM

## 2024-10-24 DIAGNOSIS — R01.1 MURMUR, CARDIAC: ICD-10-CM

## 2024-10-24 DIAGNOSIS — D58.0 HEREDITARY SPHEROCYTOSIS (CMS-HCC): ICD-10-CM

## 2024-10-24 DIAGNOSIS — E78.5 HYPERLIPIDEMIA, UNSPECIFIED HYPERLIPIDEMIA TYPE: ICD-10-CM

## 2024-10-24 DIAGNOSIS — F32.A ANXIETY AND DEPRESSION: Primary | ICD-10-CM

## 2024-10-24 PROBLEM — R53.83 FATIGUE: Status: RESOLVED | Noted: 2023-09-11 | Resolved: 2024-10-24

## 2024-10-24 PROCEDURE — 3008F BODY MASS INDEX DOCD: CPT | Performed by: FAMILY MEDICINE

## 2024-10-24 PROCEDURE — 99214 OFFICE O/P EST MOD 30 MIN: CPT | Performed by: FAMILY MEDICINE

## 2024-10-24 PROCEDURE — 1036F TOBACCO NON-USER: CPT | Performed by: FAMILY MEDICINE

## 2024-10-24 RX ORDER — FLUOXETINE HYDROCHLORIDE 20 MG/1
20 CAPSULE ORAL DAILY
Qty: 90 CAPSULE | Refills: 1 | Status: SHIPPED | OUTPATIENT
Start: 2024-10-24 | End: 2025-10-19

## 2024-10-24 RX ORDER — BUPROPION HYDROCHLORIDE 150 MG/1
150 TABLET ORAL EVERY MORNING
Qty: 30 TABLET | Refills: 1 | Status: SHIPPED | OUTPATIENT
Start: 2024-10-24 | End: 2024-12-23

## 2024-10-24 RX ORDER — MECLIZINE HYDROCHLORIDE 25 MG/1
TABLET ORAL
Start: 2024-10-24

## 2024-10-24 ASSESSMENT — ANXIETY QUESTIONNAIRES
IF YOU CHECKED OFF ANY PROBLEMS ON THIS QUESTIONNAIRE, HOW DIFFICULT HAVE THESE PROBLEMS MADE IT FOR YOU TO DO YOUR WORK, TAKE CARE OF THINGS AT HOME, OR GET ALONG WITH OTHER PEOPLE: NOT DIFFICULT AT ALL
1. FEELING NERVOUS, ANXIOUS, OR ON EDGE: NOT AT ALL
4. TROUBLE RELAXING: SEVERAL DAYS
3. WORRYING TOO MUCH ABOUT DIFFERENT THINGS: NOT AT ALL
5. BEING SO RESTLESS THAT IT IS HARD TO SIT STILL: NOT AT ALL
6. BECOMING EASILY ANNOYED OR IRRITABLE: NOT AT ALL
2. NOT BEING ABLE TO STOP OR CONTROL WORRYING: SEVERAL DAYS
7. FEELING AFRAID AS IF SOMETHING AWFUL MIGHT HAPPEN: NOT AT ALL
GAD7 TOTAL SCORE: 2

## 2024-10-24 ASSESSMENT — PATIENT HEALTH QUESTIONNAIRE - PHQ9
5. POOR APPETITE OR OVEREATING: NOT AT ALL
4. FEELING TIRED OR HAVING LITTLE ENERGY: SEVERAL DAYS
SUM OF ALL RESPONSES TO PHQ9 QUESTIONS 1 AND 2: 0
2. FEELING DOWN, DEPRESSED OR HOPELESS: NOT AT ALL
8. MOVING OR SPEAKING SO SLOWLY THAT OTHER PEOPLE COULD HAVE NOTICED. OR THE OPPOSITE, BEING SO FIGETY OR RESTLESS THAT YOU HAVE BEEN MOVING AROUND A LOT MORE THAN USUAL: NOT AT ALL
10. IF YOU CHECKED OFF ANY PROBLEMS, HOW DIFFICULT HAVE THESE PROBLEMS MADE IT FOR YOU TO DO YOUR WORK, TAKE CARE OF THINGS AT HOME, OR GET ALONG WITH OTHER PEOPLE: NOT DIFFICULT AT ALL
7. TROUBLE CONCENTRATING ON THINGS, SUCH AS READING THE NEWSPAPER OR WATCHING TELEVISION: NOT AT ALL
3. TROUBLE FALLING OR STAYING ASLEEP OR SLEEPING TOO MUCH: NOT AT ALL
SUM OF ALL RESPONSES TO PHQ QUESTIONS 1-9: 1
1. LITTLE INTEREST OR PLEASURE IN DOING THINGS: NOT AT ALL
6. FEELING BAD ABOUT YOURSELF - OR THAT YOU ARE A FAILURE OR HAVE LET YOURSELF OR YOUR FAMILY DOWN: NOT AT ALL
9. THOUGHTS THAT YOU WOULD BE BETTER OFF DEAD, OR OF HURTING YOURSELF: NOT AT ALL

## 2024-10-24 NOTE — ASSESSMENT & PLAN NOTE
Stable, continue the Meclizine up to nightly for BPPV and insomnia.  To call for refills when due.

## 2024-10-24 NOTE — PROGRESS NOTES
Subjective   Patient ID: Freida Mcbride is a 34 y.o. female who presents for Follow-up (Pt presents for 6 month follow up- pt states no concerns/questions at this time.) and Flu Vaccine (Pt declines flu vaccine at this time. /).    HPI     Patient presents today for 3 month follow-up.    Patient concerns:  No new concerns or issues. Patient is doing well overall.     ROUTINE VISIT  CHRONIC CONDITIONS:   MANJINDER  5/2024 HSAT showed mild sleep apnea    Patient states that she is sleeping better, no more snoring.  Does not think she would be able to tolerate CPAP, does not think she could sleep with something on her face.  Working on weight loss with dietary changes and exercises.    Mood  Hx of anxiety and depression.  10/2023 TSH normal     Current regimen:  Prozac 20 mg daily, started 9/2023     Prior medications:   Zoloft, ineffective ad caused weight gain.    PHQ-9:   1 - Q9 was 0  JUANITA-7:   2    Although previously increased to 60 mg patient reports today she is taking 20 mg of the Prozac only.  Felt increase was helpful when family member passed but then became to tired on high dose.  However, she and her  both notice when she does not take at all.  Feels the 20 mg dose is good fo her.  Mood is doing well overall, no current issues with depression or anxiety  Really working on weight loss with diet and exercise, down 6 lbs since last visit  Got rid of pop, does not like eating out, drinking more water.  Would be open to medication assisted weight loss.    Hereditary Spherocytosis  S/p splenectomy age 3  Not presently on any therapy, CBC monitored routinely.  Mostly presents with leukocytosis, will occasional have anemia or abnormal platelets.    10/2024 CBC with WBC 16.7 and PLTs 471 k, no anemia.  10/2023 CBC with WBC 13.7 and PLTs 593 k, no anemia.     Heart murmur  2-3/6 systolic murmur RT upper and LT lower sternal borders noted on exam 04/2024  Patient also experiencing palpitations but no chest pain or  "SOB.  Because symptomatic and + FMHx of valvular heart disease will get echocardiogram and home sleep study.    5/2024 TTE: LVEF 60-65%. No concerning abnormalities noted.  5/2024 HSAT revealed mild sleep apnea    HLD  Lifestyle managed  Reports father with hx of MI/stroke at age 45  Reports grandmother with hx of MI.    10/2024 TC/HDL ratio 6.3, HDL 28, , TRIG 256  10/2023 TC/HDL ratio 6.4, HDL 28, , TRIG 185    Patient denies any facial droop, sudden vision loss, weakness or numbness on one side of body.   Patient denies chest pain, shortness of breath with exertion, palpitations, or symptoms of claudication.     Review of Systems   All other systems reviewed and are negative.      Objective   /75 (BP Location: Left arm, Patient Position: Sitting, BP Cuff Size: Large adult)   Pulse 75   Temp 37 °C (98.6 °F) (Temporal)   Ht 1.765 m (5' 9.5\")   Wt 111 kg (244 lb)   LMP 09/10/2024 (Approximate)   SpO2 95%   BMI 35.52 kg/m²     Physical Exam  Vitals and nursing note reviewed.   Constitutional:       General: She is not in acute distress.     Appearance: Normal appearance. She is not toxic-appearing.   HENT:      Head: Normocephalic and atraumatic.      Mouth/Throat:      Mouth: Mucous membranes are moist.      Pharynx: Oropharynx is clear.      Comments: Low soft palate; narrow maxilla, macroglossia  Neck:      Thyroid: No thyromegaly.   Cardiovascular:      Rate and Rhythm: Normal rate and regular rhythm.      Heart sounds: Murmur heard.      Systolic murmur is present with a grade of 2/6.      No friction rub. No gallop.   Pulmonary:      Effort: Pulmonary effort is normal.      Breath sounds: Normal breath sounds. No wheezing, rhonchi or rales.   Abdominal:      General: Bowel sounds are normal. There is no distension.      Palpations: Abdomen is soft. There is no mass.      Tenderness: There is no abdominal tenderness. There is no guarding.   Musculoskeletal:      Right lower leg: Edema " (trace) present.      Left lower leg: Edema (trace) present.   Lymphadenopathy:      Cervical: No cervical adenopathy.   Neurological:      General: No focal deficit present.      Mental Status: She is alert and oriented to person, place, and time.   Psychiatric:         Mood and Affect: Mood normal.         Behavior: Behavior normal.         Assessment/Plan   Problem List Items Addressed This Visit             ICD-10-CM    Mild sleep apnea G47.30     05/2024 HSAT showed mild sleep apnea     I discussed sleep study with patient. Since only mild and she is finding improvement in sleep with maximizing lifestyle efforts (diet, exercise, weight reduction) patient defers CPAP therapy at this time. Additionally, she does not feel she would tolerate this CPAP device on her face during sleep.         Class 2 obesity with body mass index (BMI) of 35.0 to 35.9 in adult E66.812, Z68.35     Patient down 6 lbs from prior visit, commended on her great efforts and progress.  Patient has cut out pop and eats out very rarely.  Heart healthy diet and exercise recommendations revisited.   Start trial of Wellbutrin for appetite         Anxiety and depression - Primary F41.9, F32.A     Scales and scores reviewed and discussed, these indicate clinical remission on current regimen.    Plan:  -Continue Prozac 20 mg daily  -Start Wellbutrin 150 mg daily, although this helps with depression patient starting this for appetite suppression. Advised that can increase to 300 mg daily if tolerated but still room for improvement.  -Will recheck scales at next routine follow-up.  -Patient to reach out to revisit mood/scales sooner if suboptimally controlled on above regimen.         Relevant Medications    buPROPion XL (Wellbutrin XL) 150 mg 24 hr tablet    FLUoxetine (PROzac) 20 mg capsule    Hereditary spherocytosis (CMS-HCC) D58.0     S/p splenectomy age 3, not presently on any therapy.    10/2024 CBC with WBC 16.7 and PLTs 471 k, no  anemia.  10/2024 CBC stable in comparison to previously    No medical therapy indicated at this time.  Will continue with routine monitoring of CBC, at minimum to be done annually.         HLD (hyperlipidemia) E78.5     Lifestyle managed  Reports FMHx of father with MI/stroke age 45 and grandmother with hx of MI  Would consider doing CT cardiac scoring in her 40s    10/2024 TC/HDL ratio 6.3, HDL 28, , TRIG 256  10/2023 TC/HDL ratio 6.4, HDL 28, , TRIG 185  Goal TC/HDL ratio 3.4 or less, LDL 99 or less,  or less, and TRIG 150 or less.     -Consider starting fish oil supplementation  -Consider starting Bergemot supplementation  -Diet and exercise recommendations revisited.   -Recheck lipid panel prior to 6 month follow-up.    To lower this with lifestyle measures:  -make sure you are avoiding refined carbs such as breads, pasta, cereal, candy, soda,  nutrition bars, granola, chips, and sugar sweetened beverages.      -eat 5- 7 servings daily of veggies,  healthy protein such as chicken, fish,  beans, and eggs, and include healthy fats in your diet such as seeds, nuts, olive oil, avocados, and salmon.   -exercise 4 - 6 days per week as you are able, 150 minutes total weekly divided up is recommended.  -consider taking the fiber product psyllium capsules 2 times daily (generic brand is fine) , or a brand name psyllium such as [employer] or Metamucil. WORK UP TO 2 TIMES DAILY.  -consider also adding plant stanols and sterols such as Nature Made CholestOff, available over the counter.          BPPV (benign paroxysmal positional vertigo) H81.10     Stable, continue the Meclizine up to nightly for BPPV and insomnia.  To call for refills when due.         Relevant Medications    meclizine (Antivert) 25 mg tablet    Murmur, cardiac R01.1     5/2024 TTE: LVEF 60-65%. No concerning abnormalities noted.   Murmur noted on exam today, echo reviewed as noted above with patient, no concerning  abnormalities.  No further evaluation indicated at this time.              Follow-up in 6 months for routine care.  Scales upon rooming.   Labs to be done prior.   Call for sooner follow-up if needed.     Scribe Attestation  By signing my name below, ITanisha Scribe   attest that this documentation has been prepared under the direction and in the presence of Callie Cage DO.

## 2024-10-24 NOTE — PATIENT INSTRUCTIONS
Hyperlipidemia--    For a lifestyle modification and supplement approach to improving cholesterol, consider taking the fiber product psyllium capsules or powder 2 times daily (generic brand is fine) , or a brand name psyllium such as [employer] [facility] or Metamucil.   Consider also adding plant stanols and sterols such as Nature Made CholestOff, available over the counter.   Both have some data for lowering cholesterol.        Make sure you are limiting refined carbohydrates such as breads, alcohol, cereals, pasta, pretzels, pastries, desserts, and any sugar sweetened beverages.    Eat at least 5 -7 servings of vegetables or fruit daily, aiming for more veggies than fruit.  Eat lean proteins  such as eggs, fish, chicken, beans.   Healthy whole grains can be included such as long -cooking brown rice, quinoa, or millet.   Eat some healthy fats each day - a handful of almonds or walnuts, an avocado, some olive oil on your salad.      150 minutes of exercise per week divided up on multiple days is recommended if you are able.    !!!!!!!!!!!Bergamot is a supplement made from an Italian citrus fruit that may help lower inflammation, blood pressure, and cholesterol.  The extract is used as a fragrance in essential oils, but is also used as an oral supplement to help with blood pressure, cholesterol, and diabetes.    Its high flavanoid content may help to quiet inflammation and improve vascular health.   Take up to 1000 mg daily for 3 months, then cycle off for 3 months.    You can use every other quarter for years.  Bergamot may increase sun sensitivity, particularly when used topically.       !!!!!!!!!!!Add fish oil or krill oil 1000 mg daily     For mood and wt loss- adding wellbutrin 150 mg daily,  can call for refill at 150 mg or 300 mg daily  (patient will call)     Antivert (meclizine)  can use nightly for positional vertigo before you go to bed      Recheck chol numbers in 6 months - pls do blood work about a week  before the visit (don't eat after 8 pm the night before your blood draw)      Would do cardiac calcium score at age 38 in light of low hdl,   high ratio tc:hdl, and because her father had an MI age 45

## 2024-10-24 NOTE — ASSESSMENT & PLAN NOTE
S/p splenectomy age 3, not presently on any therapy.    10/2024 CBC with WBC 16.7 and PLTs 471 k, no anemia.  10/2024 CBC stable in comparison to previously    No medical therapy indicated at this time.  Will continue with routine monitoring of CBC, at minimum to be done annually.

## 2024-10-24 NOTE — ASSESSMENT & PLAN NOTE
Lifestyle managed  Reports FMHx of father with MI/stroke age 45 and grandmother with hx of MI  Would consider doing CT cardiac scoring in her 40s    10/2024 TC/HDL ratio 6.3, HDL 28, , TRIG 256  10/2023 TC/HDL ratio 6.4, HDL 28, , TRIG 185  Goal TC/HDL ratio 3.4 or less, LDL 99 or less,  or less, and TRIG 150 or less.     -Consider starting fish oil supplementation  -Consider starting Bergemot supplementation  -Diet and exercise recommendations revisited.   -Recheck lipid panel prior to 6 month follow-up.    To lower this with lifestyle measures:  -make sure you are avoiding refined carbs such as breads, pasta, cereal, candy, soda,  nutrition bars, granola, chips, and sugar sweetened beverages.      -eat 5- 7 servings daily of veggies,  healthy protein such as chicken, fish,  beans, and eggs, and include healthy fats in your diet such as seeds, nuts, olive oil, avocados, and salmon.   -exercise 4 - 6 days per week as you are able, 150 minutes total weekly divided up is recommended.  -consider taking the fiber product psyllium capsules 2 times daily (generic brand is fine) , or a brand name psyllium such as [employer] or Metamucil. WORK UP TO 2 TIMES DAILY.  -consider also adding plant stanols and sterols such as Nature Made CholestOff, available over the counter.

## 2024-10-24 NOTE — ASSESSMENT & PLAN NOTE
Scales and scores reviewed and discussed, these indicate clinical remission on current regimen.    Plan:  -Continue Prozac 20 mg daily  -Start Wellbutrin 150 mg daily, although this helps with depression patient starting this for appetite suppression. Advised that can increase to 300 mg daily if tolerated but still room for improvement.  -Will recheck scales at next routine follow-up.  -Patient to reach out to revisit mood/scales sooner if suboptimally controlled on above regimen.

## 2024-10-24 NOTE — ASSESSMENT & PLAN NOTE
05/2024 HSAT showed mild sleep apnea     I discussed sleep study with patient. Since only mild and she is finding improvement in sleep with maximizing lifestyle efforts (diet, exercise, weight reduction) patient defers CPAP therapy at this time. Additionally, she does not feel she would tolerate this CPAP device on her face during sleep.

## 2024-10-24 NOTE — ASSESSMENT & PLAN NOTE
Patient down 6 lbs from prior visit, commended on her great efforts and progress.  Patient has cut out pop and eats out very rarely.  Heart healthy diet and exercise recommendations revisited.   Start trial of Wellbutrin for appetite

## 2024-10-24 NOTE — ASSESSMENT & PLAN NOTE
5/2024 TTE: LVEF 60-65%. No concerning abnormalities noted.   Murmur noted on exam today, echo reviewed as noted above with patient, no concerning abnormalities.  No further evaluation indicated at this time.

## 2024-11-14 DIAGNOSIS — F41.9 ANXIETY AND DEPRESSION: ICD-10-CM

## 2024-11-14 DIAGNOSIS — F32.A ANXIETY AND DEPRESSION: ICD-10-CM

## 2024-11-14 RX ORDER — BUPROPION HYDROCHLORIDE 300 MG/1
300 TABLET ORAL EVERY MORNING
Qty: 30 TABLET | Refills: 1 | Status: SHIPPED | OUTPATIENT
Start: 2024-11-14 | End: 2025-01-13

## 2025-01-18 DIAGNOSIS — F41.9 ANXIETY AND DEPRESSION: ICD-10-CM

## 2025-01-18 DIAGNOSIS — F32.A ANXIETY AND DEPRESSION: ICD-10-CM

## 2025-01-20 DIAGNOSIS — F41.9 ANXIETY AND DEPRESSION: ICD-10-CM

## 2025-01-20 DIAGNOSIS — H81.10 BENIGN PAROXYSMAL POSITIONAL VERTIGO, UNSPECIFIED LATERALITY: ICD-10-CM

## 2025-01-20 DIAGNOSIS — F32.A ANXIETY AND DEPRESSION: ICD-10-CM

## 2025-01-20 RX ORDER — MECLIZINE HYDROCHLORIDE 25 MG/1
TABLET ORAL
Qty: 30 TABLET | Refills: 0 | Status: SHIPPED | OUTPATIENT
Start: 2025-01-20

## 2025-01-20 RX ORDER — BUPROPION HYDROCHLORIDE 300 MG/1
300 TABLET ORAL EVERY MORNING
Qty: 30 TABLET | Refills: 1 | Status: SHIPPED | OUTPATIENT
Start: 2025-01-20 | End: 2025-03-21

## 2025-01-20 RX ORDER — BUPROPION HYDROCHLORIDE 300 MG/1
300 TABLET ORAL EVERY MORNING
Qty: 30 TABLET | Refills: 3 | OUTPATIENT
Start: 2025-01-20 | End: 2025-05-19

## 2025-02-14 DIAGNOSIS — F41.9 ANXIETY AND DEPRESSION: ICD-10-CM

## 2025-02-14 DIAGNOSIS — F32.A ANXIETY AND DEPRESSION: ICD-10-CM

## 2025-02-18 RX ORDER — BUPROPION HYDROCHLORIDE 300 MG/1
300 TABLET ORAL EVERY MORNING
Qty: 90 TABLET | Refills: 1 | Status: SHIPPED | OUTPATIENT
Start: 2025-02-18 | End: 2025-08-17

## 2025-04-24 ENCOUNTER — OFFICE VISIT (OUTPATIENT)
Dept: PRIMARY CARE | Facility: CLINIC | Age: 35
End: 2025-04-24
Payer: COMMERCIAL

## 2025-04-24 ENCOUNTER — APPOINTMENT (OUTPATIENT)
Dept: PRIMARY CARE | Facility: CLINIC | Age: 35
End: 2025-04-24
Payer: COMMERCIAL

## 2025-04-24 VITALS
HEIGHT: 69 IN | RESPIRATION RATE: 16 BRPM | OXYGEN SATURATION: 96 % | TEMPERATURE: 97.7 F | DIASTOLIC BLOOD PRESSURE: 78 MMHG | SYSTOLIC BLOOD PRESSURE: 125 MMHG | BODY MASS INDEX: 35.7 KG/M2 | HEART RATE: 87 BPM | WEIGHT: 241 LBS

## 2025-04-24 DIAGNOSIS — Z00.00 ROUTINE HEALTH MAINTENANCE: ICD-10-CM

## 2025-04-24 DIAGNOSIS — F41.9 ANXIETY: Primary | ICD-10-CM

## 2025-04-24 DIAGNOSIS — H81.10 BENIGN PAROXYSMAL POSITIONAL VERTIGO, UNSPECIFIED LATERALITY: ICD-10-CM

## 2025-04-24 DIAGNOSIS — Z83.49 FAMILY HISTORY OF THYROID DISEASE: ICD-10-CM

## 2025-04-24 DIAGNOSIS — E66.812 CLASS 2 OBESITY WITH BODY MASS INDEX (BMI) OF 36.0 TO 36.9 IN ADULT, UNSPECIFIED OBESITY TYPE, UNSPECIFIED WHETHER SERIOUS COMORBIDITY PRESENT: ICD-10-CM

## 2025-04-24 DIAGNOSIS — E78.2 ELEVATED TRIGLYCERIDES WITH HIGH CHOLESTEROL: ICD-10-CM

## 2025-04-24 PROBLEM — F32.A ANXIETY AND DEPRESSION: Status: RESOLVED | Noted: 2023-09-11 | Resolved: 2025-04-24

## 2025-04-24 LAB
ANION GAP SERPL CALCULATED.4IONS-SCNC: 11 MMOL/L (CALC) (ref 7–17)
BUN SERPL-MCNC: 10 MG/DL (ref 7–25)
BUN/CREAT SERPL: NORMAL (CALC) (ref 6–22)
CALCIUM SERPL-MCNC: 9.4 MG/DL (ref 8.6–10.2)
CHLORIDE SERPL-SCNC: 103 MMOL/L (ref 98–110)
CHOLEST SERPL-MCNC: 185 MG/DL
CHOLEST/HDLC SERPL: 6.6 (CALC)
CO2 SERPL-SCNC: 24 MMOL/L (ref 20–32)
CREAT SERPL-MCNC: 0.76 MG/DL (ref 0.5–0.97)
EGFRCR SERPLBLD CKD-EPI 2021: 104 ML/MIN/1.73M2
GLUCOSE SERPL-MCNC: 97 MG/DL (ref 65–99)
HDLC SERPL-MCNC: 28 MG/DL
LDLC SERPL CALC-MCNC: ABNORMAL MG/DL
NONHDLC SERPL-MCNC: 157 MG/DL (CALC)
POC HEMOGLOBIN A1C: 5.2 % (ref 4.2–6.5)
POTASSIUM SERPL-SCNC: 4.2 MMOL/L (ref 3.5–5.3)
SODIUM SERPL-SCNC: 138 MMOL/L (ref 135–146)
TRIGL SERPL-MCNC: 428 MG/DL

## 2025-04-24 PROCEDURE — 1036F TOBACCO NON-USER: CPT | Performed by: FAMILY MEDICINE

## 2025-04-24 PROCEDURE — 3008F BODY MASS INDEX DOCD: CPT | Performed by: FAMILY MEDICINE

## 2025-04-24 PROCEDURE — 99214 OFFICE O/P EST MOD 30 MIN: CPT | Performed by: FAMILY MEDICINE

## 2025-04-24 PROCEDURE — 83036 HEMOGLOBIN GLYCOSYLATED A1C: CPT | Performed by: FAMILY MEDICINE

## 2025-04-24 RX ORDER — BUPROPION HYDROCHLORIDE 150 MG/1
150 TABLET ORAL EVERY MORNING
Qty: 30 TABLET | Refills: 0 | Status: SHIPPED | OUTPATIENT
Start: 2025-04-24 | End: 2025-05-24

## 2025-04-24 RX ORDER — NAPROXEN SODIUM 220 MG/1
TABLET ORAL
COMMUNITY

## 2025-04-24 RX ORDER — MECLIZINE HYDROCHLORIDE 25 MG/1
TABLET ORAL
Qty: 30 TABLET | Refills: 0 | Status: SHIPPED | OUTPATIENT
Start: 2025-04-24

## 2025-04-24 NOTE — PROGRESS NOTES
Subjective   Patient ID: Freida Mcbride is a 34 y.o. female who presents for Anxiety and Depression (The patient was made aware that AI (artificial intelligence) technology will be utilized during today's visit. The patient expressed understanding and verbally accepts the use of AI technology. /).  History of Present Illness  Freida Mcbride is a 34 year old female who presents for a six-month follow-up on medications.    She is currently taking Wellbutrin and Prozac. Wellbutrin was initially prescribed to aid in weight loss, but she has not experienced any weight loss despite maintaining a healthy diet and regular exercise, including gym workouts and farm work. She is frustrated as her  has lost weight while she has not. The duration of Wellbutrin use is unspecified, and there have been no significant changes in her weight. Today, her PHQ-9: 5 and JUANITA-7: 8. She feels she is benefiting still from the Prozac, used to be on 60mg, now taking 20mg.     She has a family history of diabetes, with her father and sister affected, and her father using an insulin pump. Recent blood work showed high triglycerides at 428 mg/dL. She mentions a blood disorder affecting her lab results (asplenia). Her family history also includes her father having open heart surgery and her sister, who was an identical triplet, having  from a heart attack three years ago secondary to covid-19. Her other triplet sibling has Down syndrome and associated heart issues. She has undergone genetic testing for heart conditions due to her sister's history. She has had an echocardiogram in  and been evaluated by cardiology in recent past.     She has a history of mild sleep apnea but does not use a CPAP machine. She is concerned about her weight due to her mother's history of obesity. Prozac has been beneficial for her, and she was initially prescribed Wellbutrin following her grandfather's death, which led to significant depression. She has not  "engaged in counseling or therapy, finding her children to be a good distraction.    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 No swelling or pain in the neck, and no need for refills on Prozac currently.     Review of Systems  ROS otherwise negative aside from what was mentioned above in HPI.    Objective     /78 (BP Location: Right arm, Patient Position: Sitting, BP Cuff Size: Large adult)   Pulse 87   Temp 36.5 °C (97.7 °F) (Temporal)   Resp 16   Ht 1.74 m (5' 8.5\")   Wt 109 kg (241 lb)   LMP 04/15/2025   SpO2 96%   BMI 36.11 kg/m²      Current Outpatient Medications   Medication Instructions    buPROPion XL (WELLBUTRIN XL) 150 mg, oral, Every morning, Do not crush, chew, or split.    FLUoxetine (PROZAC) 20 mg, oral, Daily    meclizine (Antivert) 25 mg tablet ONE TAB NIGHTLY FOR POSITIONAL VERTIGO AND SLEEP    multivitamin with minerals (multivitamin-iron-folic acid) tablet 1 tablet, Daily RT    omega 3-dha-epa-fish oil (Fish OiL) 1,200 (144-216) mg capsule Take by mouth.       Physical Exam  Constitutional: Well developed, well nourished, alert and in no acute distress   Eyes: Normal external exam.   Neck: Supple, no lymphadenopathy or masses. No thyromegaly.   Cardiovascular: Regular rate and rhythm, normal S1 and S2, no murmurs, gallops, or rubs. Radial pulses normal. No peripheral edema.  Pulmonary: No respiratory distress, lungs clear to auscultation bilaterally. No wheezes, rhonchi, rales.  Skin: Warm, well perfused, normal skin turgor and color.   Neurologic: Cranial nerves II-XII grossly intact.   Psychiatric: Mood calm and affect normal.        Results  Io A1c 5.2 (normal)    LABS  Triglycerides: 428 mg/dL (04/23/2025)  A1c: 5.2% (04/24/2025)    Assessment & Plan  Hypertriglyceridemia  Triglycerides at 428 mg/dL, significantly elevated. Family history of cardiovascular disease, including father's open heart surgery and sister's myocardial infarction, " suggests potential genetic dyslipidemia. Current management includes fish oil supplementation. Familial hyperlipidemia considered, necessitating further testing.  - Recheck thyroid function and TPO antibodies.  - Order genetic testing for lipoprotein phenotype to assess for familial hyperlipidemia.  - Continue fish oil supplementation.  - Consider cardiology referral based on genetic testing results.    Depression  Managed with Wellbutrin and Prozac. Wellbutrin initially prescribed for weight loss and depression post-significant life event. Current PHQ-9 score is 5 (minimal depression), JUANITA-7 score is 8 (mild anxiety). She wishes to discontinue Wellbutrin due to lack of perceived benefit and weight management goals. Plan to wean off Wellbutrin while monitoring mood and anxiety.  - Wean off Wellbutrin by reducing dose to 150 mg for 1-2 weeks, then discontinue if symptoms remain unchanged. Please update me via [facility]. regarding mood.   - Continue Prozac as prescribed.  - Monitor mood and anxiety symptoms during medication adjustment.      VISIT SUMMARY:  During your visit today, we discussed your current medications and health concerns. We reviewed your recent blood work, family history, and your experiences with Wellbutrin and Prozac. We also talked about your weight management efforts, sleep apnea, and the impact of your medications on your overall health.    INSTRUCTIONS:  We will recheck your thyroid function and TPO antibodies, and order genetic testing for lipoprotein phenotype. Please continue taking your fish oil supplements. We will gradually reduce your Wellbutrin dose to 150 mg for 1-2 weeks and then discontinue it if your symptoms remain stable. Continue taking Prozac as prescribed. Monitor your mood and anxiety symptoms during this adjustment. If your sleep apnea symptoms worsen, we may need to evaluate further. Follow up with us after completing the genetic testing and thyroid function  tests.    Leanne Hurt DO     This medical note was created with the assistance of artificial intelligence (AI) for documentation purposes. The content has been reviewed and confirmed by the healthcare provider for accuracy and completeness. Patient consented to the use of audio recording and use of AI during their visit.

## 2025-05-22 ENCOUNTER — OFFICE VISIT (OUTPATIENT)
Dept: PRIMARY CARE | Facility: CLINIC | Age: 35
End: 2025-05-22
Payer: COMMERCIAL

## 2025-05-22 VITALS
HEART RATE: 74 BPM | RESPIRATION RATE: 16 BRPM | DIASTOLIC BLOOD PRESSURE: 90 MMHG | TEMPERATURE: 98.1 F | OXYGEN SATURATION: 96 % | SYSTOLIC BLOOD PRESSURE: 132 MMHG

## 2025-05-22 DIAGNOSIS — R05.2 SUBACUTE COUGH: ICD-10-CM

## 2025-05-22 DIAGNOSIS — J01.00 ACUTE NON-RECURRENT MAXILLARY SINUSITIS: Primary | ICD-10-CM

## 2025-05-22 PROCEDURE — 99213 OFFICE O/P EST LOW 20 MIN: CPT | Performed by: FAMILY MEDICINE

## 2025-05-22 PROCEDURE — 1036F TOBACCO NON-USER: CPT | Performed by: FAMILY MEDICINE

## 2025-05-22 RX ORDER — DOXYCYCLINE 100 MG/1
100 CAPSULE ORAL 2 TIMES DAILY
Qty: 14 CAPSULE | Refills: 0 | Status: SHIPPED | OUTPATIENT
Start: 2025-05-22 | End: 2025-05-29

## 2025-05-22 RX ORDER — BENZONATATE 200 MG/1
200 CAPSULE ORAL 3 TIMES DAILY PRN
Qty: 15 CAPSULE | Refills: 0 | Status: SHIPPED | OUTPATIENT
Start: 2025-05-22 | End: 2025-05-27

## 2025-05-22 NOTE — LETTER
May 22, 2025     Patient: Freida Mcbride   YOB: 1990   Date of Visit: 5/22/2025       To Whom It May Concern:    Freida Mcbride was seen in my clinic on 5/22/2025 at 10:45 am. Please excuse Freida for her absence from work on this day to make the appointment.    If you have any questions or concerns, please don't hesitate to call.         Sincerely,         Leanne Hurt, DO        CC: No Recipients

## 2025-05-22 NOTE — PROGRESS NOTES
Subjective   Patient ID: Freida Mcbride is a 34 y.o. female who presents for URI (X3 weeks, congestion, cough, fatigue ).  History of Present Illness  Freida Mcbride is a 34 year old female who presents with persistent URI symptoms.    She has been experiencing respiratory symptoms for the past three weeks, characterized by intermittent nasal congestion, cough, and chest discomfort. The symptoms have shown periods of improvement followed by recurrence, with a recent exacerbation marked by head congestion and a productive cough.    She works in a school environment, which she believes contributes to her recurrent symptoms due to exposure to illnesses circulating among children. No fever, but she reports chills. Over-the-counter cold medications have been used, and only minimally helping.    Her cough is productive with green sputum, and she experiences ear discomfort when she has sinus issues. Symptoms tend to worsen with cold weather changes. No stomach upset.    She last took antibiotics a month ago following dental work and has no known drug allergies. She has been using a nasal spray, particularly before bed, to manage her symptoms.    Review of Systems  ROS otherwise negative aside from what was mentioned above in HPI.    Objective     /90 (BP Location: Right arm, Patient Position: Sitting, BP Cuff Size: Large adult)   Pulse 74   Temp 36.7 °C (98.1 °F) (Temporal)   Resp 16   LMP 04/15/2025   SpO2 96%      Current Outpatient Medications   Medication Instructions    benzonatate (TESSALON) 200 mg, oral, 3 times daily PRN, Do not crush or chew.    buPROPion XL (WELLBUTRIN XL) 150 mg, oral, Every morning, Do not crush, chew, or split.    doxycycline (VIBRAMYCIN) 100 mg, oral, 2 times daily, Take with at least 8 ounces (large glass) of water, do not lie down for 30 minutes after    FLUoxetine (PROZAC) 20 mg, oral, Daily    meclizine (Antivert) 25 mg tablet ONE TAB NIGHTLY FOR POSITIONAL VERTIGO AND SLEEP     multivitamin with minerals (multivitamin-iron-folic acid) tablet 1 tablet, Daily RT    omega 3-dha-epa-fish oil (Fish OiL) 1,200 (144-216) mg capsule Take by mouth.       Physical Exam  Constitutional: Well developed, well nourished, alert and in no acute distress.  Head and Face: NC/AT  Eyes: Normal external exam.   ENT: External inspection of ears normal, tympanic membranes visualized and normal. Nasal mucosa and turbinates swollen and erythematous, thick yellow nasal discharge present. Oral mucosa moist, oropharynx clear without tonsillar exudate or erythema.   Neck: Supple. +bilateral shotty cervical lymphadenopathy   Cardiovascular: Regular rate and rhythm, normal S1 and S2, no murmurs, gallops, or rubs.   Pulmonary: No respiratory distress, lungs clear to auscultation bilaterally. No wheezes, rhonchi, rales.  Skin: Warm, well perfused, normal skin turgor and color.   Neurologic: Cranial nerves II-XII grossly intact.      Results      Assessment & Plan  Acute sinusitis  Intermittent symptoms over three weeks, including nasal congestion, productive cough, and green sputum. Fluid in ears and nasal congestion consistent with sinus infection. No known medication allergies. Doxycycline chosen for anti-inflammatory properties and lower risk of gastrointestinal upset, with caution regarding photosensitivity.  - Prescribe doxycycline 100 mg capsule twice daily with food for 7 days.  - Continue nasal spray, especially before bed, to manage congestion.  - Prescribe Tessalon Perles for cough management, noting she does not cause drowsiness.  - Continue ibuprofen as needed for headache and discomfort.  - Advise to continue over-the-counter cold medications as needed.  - Instruct to report if symptoms do not improve.  -work excuse provided to the patient    Leanne Hurt DO     This medical note was created with the assistance of artificial intelligence (AI) for documentation purposes. The content has been reviewed and  confirmed by the healthcare provider for accuracy and completeness. Patient consented to the use of audio recording and use of AI during their visit.

## 2025-07-24 DIAGNOSIS — Z00.00 ROUTINE HEALTH MAINTENANCE: ICD-10-CM

## 2025-09-01 DIAGNOSIS — H81.10 BENIGN PAROXYSMAL POSITIONAL VERTIGO, UNSPECIFIED LATERALITY: ICD-10-CM

## 2025-09-02 RX ORDER — MECLIZINE HYDROCHLORIDE 25 MG/1
TABLET ORAL
Qty: 30 TABLET | Refills: 0 | Status: SHIPPED | OUTPATIENT
Start: 2025-09-02

## 2025-10-22 ENCOUNTER — APPOINTMENT (OUTPATIENT)
Dept: PRIMARY CARE | Facility: CLINIC | Age: 35
End: 2025-10-22
Payer: COMMERCIAL